# Patient Record
Sex: FEMALE | Race: WHITE
[De-identification: names, ages, dates, MRNs, and addresses within clinical notes are randomized per-mention and may not be internally consistent; named-entity substitution may affect disease eponyms.]

---

## 2017-01-09 ENCOUNTER — HOSPITAL ENCOUNTER (EMERGENCY)
Age: 82
Discharge: HOME | End: 2017-01-09
Payer: MEDICARE

## 2017-01-09 DIAGNOSIS — Z86.711: ICD-10-CM

## 2017-01-09 DIAGNOSIS — H53.8: Primary | ICD-10-CM

## 2017-01-09 DIAGNOSIS — Z79.82: ICD-10-CM

## 2017-01-09 DIAGNOSIS — R51: ICD-10-CM

## 2017-01-09 DIAGNOSIS — Z86.718: ICD-10-CM

## 2017-01-09 DIAGNOSIS — I10: ICD-10-CM

## 2017-01-09 DIAGNOSIS — Z95.0: ICD-10-CM

## 2017-01-09 DIAGNOSIS — N30.00: ICD-10-CM

## 2017-01-09 DIAGNOSIS — R42: ICD-10-CM

## 2017-01-17 ENCOUNTER — HOSPITAL ENCOUNTER (OUTPATIENT)
Age: 82
Discharge: HOME | End: 2017-01-17
Payer: MEDICARE

## 2017-01-17 DIAGNOSIS — I10: ICD-10-CM

## 2017-01-17 DIAGNOSIS — I49.5: ICD-10-CM

## 2017-01-17 DIAGNOSIS — I48.91: ICD-10-CM

## 2017-01-17 DIAGNOSIS — E87.1: Primary | ICD-10-CM

## 2017-08-08 ENCOUNTER — HOSPITAL ENCOUNTER (OUTPATIENT)
Dept: HOSPITAL 76 - LAB.WCP | Age: 82
Discharge: HOME | End: 2017-08-08
Attending: PHYSICIAN ASSISTANT
Payer: MEDICARE

## 2017-08-08 DIAGNOSIS — Z79.899: ICD-10-CM

## 2017-08-08 DIAGNOSIS — Z51.81: ICD-10-CM

## 2017-08-08 DIAGNOSIS — E87.1: Primary | ICD-10-CM

## 2017-08-08 LAB
ANION GAP SERPL CALCULATED.4IONS-SCNC: 8 MMOL/L (ref 6–13)
BUN SERPL-MCNC: 25 MG/DL (ref 6–20)
CALCIUM UR-MCNC: 9.6 MG/DL (ref 8.5–10.3)
CHLORIDE SERPL-SCNC: 100 MMOL/L (ref 101–111)
CO2 SERPL-SCNC: 27 MMOL/L (ref 21–32)
CREAT SERPLBLD-SCNC: 0.8 MG/DL (ref 0.4–1)
GFRSERPLBLD MDRD-ARVRAT: 68 ML/MIN/{1.73_M2} (ref 89–?)
GLUCOSE SERPL-MCNC: 94 MG/DL (ref 70–100)
POTASSIUM SERPL-SCNC: 4.5 MMOL/L (ref 3.5–5)
SODIUM SERPLBLD-SCNC: 135 MMOL/L (ref 135–145)

## 2017-08-08 PROCEDURE — 80048 BASIC METABOLIC PNL TOTAL CA: CPT

## 2017-08-08 PROCEDURE — 36415 COLL VENOUS BLD VENIPUNCTURE: CPT

## 2018-03-07 ENCOUNTER — HOSPITAL ENCOUNTER (OUTPATIENT)
Dept: HOSPITAL 76 - LAB.WCP | Age: 83
Discharge: HOME | End: 2018-03-07
Attending: FAMILY MEDICINE
Payer: MEDICARE

## 2018-03-07 DIAGNOSIS — R42: ICD-10-CM

## 2018-03-07 DIAGNOSIS — E87.1: ICD-10-CM

## 2018-03-07 DIAGNOSIS — I49.5: Primary | ICD-10-CM

## 2018-03-07 LAB
ALBUMIN DIAFP-MCNC: 4 G/DL (ref 3.2–5.5)
ALBUMIN/GLOB SERPL: 1.5 {RATIO} (ref 1–2.2)
ALP SERPL-CCNC: 54 IU/L (ref 42–121)
ALT SERPL W P-5'-P-CCNC: 18 IU/L (ref 10–60)
ANION GAP SERPL CALCULATED.4IONS-SCNC: 8 MMOL/L (ref 6–13)
AST SERPL W P-5'-P-CCNC: 27 IU/L (ref 10–42)
BASOPHILS NFR BLD AUTO: 0 10^3/UL (ref 0–0.1)
BASOPHILS NFR BLD AUTO: 0.7 %
BILIRUB BLD-MCNC: 0.3 MG/DL (ref 0.2–1)
BUN SERPL-MCNC: 32 MG/DL (ref 6–20)
CALCIUM UR-MCNC: 9.5 MG/DL (ref 8.5–10.3)
CHLORIDE SERPL-SCNC: 101 MMOL/L (ref 101–111)
CO2 SERPL-SCNC: 27 MMOL/L (ref 21–32)
CREAT SERPLBLD-SCNC: 0.9 MG/DL (ref 0.4–1)
EOSINOPHIL # BLD AUTO: 0.2 10^3/UL (ref 0–0.7)
EOSINOPHIL NFR BLD AUTO: 4.3 %
ERYTHROCYTE [DISTWIDTH] IN BLOOD BY AUTOMATED COUNT: 12.5 % (ref 12–15)
GFRSERPLBLD MDRD-ARVRAT: 60 ML/MIN/{1.73_M2} (ref 89–?)
GLOBULIN SER-MCNC: 2.6 G/DL (ref 2.1–4.2)
GLUCOSE SERPL-MCNC: 96 MG/DL (ref 70–100)
HGB UR QL STRIP: 11.9 G/DL (ref 12–16)
LYMPHOCYTES # SPEC AUTO: 1.6 10^3/UL (ref 1.5–3.5)
LYMPHOCYTES NFR BLD AUTO: 35.2 %
MCH RBC QN AUTO: 30.4 PG (ref 27–31)
MCHC RBC AUTO-ENTMCNC: 35.4 G/DL (ref 32–36)
MCV RBC AUTO: 85.7 FL (ref 81–99)
MONOCYTES # BLD AUTO: 0.5 10^3/UL (ref 0–1)
MONOCYTES NFR BLD AUTO: 10.2 %
NEUTROPHILS # BLD AUTO: 2.3 10^3/UL (ref 1.5–6.6)
NEUTROPHILS # SNV AUTO: 4.6 X10^3/UL (ref 4.8–10.8)
NEUTROPHILS NFR BLD AUTO: 49.6 %
PDW BLD AUTO: 8.7 FL (ref 7.9–10.8)
PLATELET # BLD: 209 10^3/UL (ref 130–450)
PROT SPEC-MCNC: 6.6 G/DL (ref 6.7–8.2)
RBC MAR: 3.93 10^6/UL (ref 4.2–5.4)
SODIUM SERPLBLD-SCNC: 136 MMOL/L (ref 135–145)

## 2018-03-07 PROCEDURE — 36415 COLL VENOUS BLD VENIPUNCTURE: CPT

## 2018-03-07 PROCEDURE — 84443 ASSAY THYROID STIM HORMONE: CPT

## 2018-03-07 PROCEDURE — 85025 COMPLETE CBC W/AUTO DIFF WBC: CPT

## 2018-03-07 PROCEDURE — 80053 COMPREHEN METABOLIC PANEL: CPT

## 2018-04-13 ENCOUNTER — HOSPITAL ENCOUNTER (OUTPATIENT)
Dept: HOSPITAL 76 - LAB.WCP | Age: 83
Discharge: HOME | End: 2018-04-13
Attending: PHYSICIAN ASSISTANT
Payer: MEDICARE

## 2018-04-13 DIAGNOSIS — D50.9: ICD-10-CM

## 2018-04-13 DIAGNOSIS — E87.1: Primary | ICD-10-CM

## 2018-04-13 LAB
ANION GAP SERPL CALCULATED.4IONS-SCNC: 6 MMOL/L (ref 6–13)
BASOPHILS NFR BLD AUTO: 0 10^3/UL (ref 0–0.1)
BASOPHILS NFR BLD AUTO: 0.6 %
BUN SERPL-MCNC: 30 MG/DL (ref 6–20)
CALCIUM UR-MCNC: 9.6 MG/DL (ref 8.5–10.3)
CHLORIDE SERPL-SCNC: 100 MMOL/L (ref 101–111)
CO2 SERPL-SCNC: 26 MMOL/L (ref 21–32)
CREAT SERPLBLD-SCNC: 0.8 MG/DL (ref 0.4–1)
EOSINOPHIL # BLD AUTO: 0.1 10^3/UL (ref 0–0.7)
EOSINOPHIL NFR BLD AUTO: 2 %
ERYTHROCYTE [DISTWIDTH] IN BLOOD BY AUTOMATED COUNT: 12.8 % (ref 12–15)
GFRSERPLBLD MDRD-ARVRAT: 68 ML/MIN/{1.73_M2} (ref 89–?)
GLUCOSE SERPL-MCNC: 107 MG/DL (ref 70–100)
HGB UR QL STRIP: 11.9 G/DL (ref 12–16)
LYMPHOCYTES # SPEC AUTO: 1.5 10^3/UL (ref 1.5–3.5)
LYMPHOCYTES NFR BLD AUTO: 27 %
MCH RBC QN AUTO: 29.8 PG (ref 27–31)
MCHC RBC AUTO-ENTMCNC: 34.5 G/DL (ref 32–36)
MCV RBC AUTO: 86.2 FL (ref 81–99)
MONOCYTES # BLD AUTO: 0.6 10^3/UL (ref 0–1)
MONOCYTES NFR BLD AUTO: 10.4 %
NEUTROPHILS # BLD AUTO: 3.3 10^3/UL (ref 1.5–6.6)
NEUTROPHILS # SNV AUTO: 5.5 X10^3/UL (ref 4.8–10.8)
NEUTROPHILS NFR BLD AUTO: 60 %
PDW BLD AUTO: 8.5 FL (ref 7.9–10.8)
PLATELET # BLD: 235 10^3/UL (ref 130–450)
RBC MAR: 4.01 10^6/UL (ref 4.2–5.4)
SODIUM SERPLBLD-SCNC: 132 MMOL/L (ref 135–145)

## 2018-04-13 PROCEDURE — 85025 COMPLETE CBC W/AUTO DIFF WBC: CPT

## 2018-04-13 PROCEDURE — 36415 COLL VENOUS BLD VENIPUNCTURE: CPT

## 2018-04-13 PROCEDURE — 80048 BASIC METABOLIC PNL TOTAL CA: CPT

## 2018-05-04 ENCOUNTER — HOSPITAL ENCOUNTER (EMERGENCY)
Dept: HOSPITAL 76 - ED | Age: 83
Discharge: HOME | End: 2018-05-04
Payer: MEDICARE

## 2018-05-04 VITALS — SYSTOLIC BLOOD PRESSURE: 144 MMHG | DIASTOLIC BLOOD PRESSURE: 58 MMHG

## 2018-05-04 DIAGNOSIS — I48.91: ICD-10-CM

## 2018-05-04 DIAGNOSIS — Z86.72: ICD-10-CM

## 2018-05-04 DIAGNOSIS — Z86.711: ICD-10-CM

## 2018-05-04 DIAGNOSIS — E87.1: Primary | ICD-10-CM

## 2018-05-04 DIAGNOSIS — I10: ICD-10-CM

## 2018-05-04 DIAGNOSIS — Z95.0: ICD-10-CM

## 2018-05-04 LAB
ANION GAP SERPL CALCULATED.4IONS-SCNC: 8 MMOL/L (ref 6–13)
BASOPHILS NFR BLD AUTO: 0 10^3/UL (ref 0–0.1)
BASOPHILS NFR BLD AUTO: 0.8 %
BUN SERPL-MCNC: 23 MG/DL (ref 6–20)
CALCIUM UR-MCNC: 9.4 MG/DL (ref 8.5–10.3)
CHLORIDE SERPL-SCNC: 97 MMOL/L (ref 101–111)
CO2 SERPL-SCNC: 25 MMOL/L (ref 21–32)
CREAT SERPLBLD-SCNC: 0.7 MG/DL (ref 0.4–1)
EOSINOPHIL # BLD AUTO: 0.1 10^3/UL (ref 0–0.7)
EOSINOPHIL NFR BLD AUTO: 2.3 %
ERYTHROCYTE [DISTWIDTH] IN BLOOD BY AUTOMATED COUNT: 13 % (ref 12–15)
GFRSERPLBLD MDRD-ARVRAT: 80 ML/MIN/{1.73_M2} (ref 89–?)
GLUCOSE SERPL-MCNC: 104 MG/DL (ref 70–100)
HGB UR QL STRIP: 12.5 G/DL (ref 12–16)
INR PPP: 1 (ref 0.8–1.2)
LYMPHOCYTES # SPEC AUTO: 1.4 10^3/UL (ref 1.5–3.5)
LYMPHOCYTES NFR BLD AUTO: 24.4 %
MCH RBC QN AUTO: 30.2 PG (ref 27–31)
MCHC RBC AUTO-ENTMCNC: 35.1 G/DL (ref 32–36)
MCV RBC AUTO: 86.2 FL (ref 81–99)
MONOCYTES # BLD AUTO: 0.6 10^3/UL (ref 0–1)
MONOCYTES NFR BLD AUTO: 10.6 %
NEUTROPHILS # BLD AUTO: 3.6 10^3/UL (ref 1.5–6.6)
NEUTROPHILS # SNV AUTO: 5.7 X10^3/UL (ref 4.8–10.8)
NEUTROPHILS NFR BLD AUTO: 61.9 %
PDW BLD AUTO: 8.1 FL (ref 7.9–10.8)
PLATELET # BLD: 197 10^3/UL (ref 130–450)
PROTHROM ACT/NOR PPP: 11 SECS (ref 9.9–12.6)
RBC MAR: 4.14 10^6/UL (ref 4.2–5.4)
SODIUM SERPLBLD-SCNC: 130 MMOL/L (ref 135–145)

## 2018-05-04 PROCEDURE — 99284 EMERGENCY DEPT VISIT MOD MDM: CPT

## 2018-05-04 PROCEDURE — 80048 BASIC METABOLIC PNL TOTAL CA: CPT

## 2018-05-04 PROCEDURE — 36415 COLL VENOUS BLD VENIPUNCTURE: CPT

## 2018-05-04 PROCEDURE — 70450 CT HEAD/BRAIN W/O DYE: CPT

## 2018-05-04 PROCEDURE — 85610 PROTHROMBIN TIME: CPT

## 2018-05-04 PROCEDURE — 93306 TTE W/DOPPLER COMPLETE: CPT

## 2018-05-04 PROCEDURE — 85025 COMPLETE CBC W/AUTO DIFF WBC: CPT

## 2018-05-04 PROCEDURE — 93880 EXTRACRANIAL BILAT STUDY: CPT

## 2018-05-04 PROCEDURE — 93005 ELECTROCARDIOGRAM TRACING: CPT

## 2018-05-04 NOTE — ED PHYSICIAN DOCUMENTATION
History of Present Illness





- Stated complaint


Stated Complaint: MIGRAINE/STROKE LIKE SX





- Chief complaint


Chief Complaint: Neuro





- Additonal information


Additional information: 





hx from pt


85 female


to ER concerned she is having TIAs


10 days of int sx of dizziness (by which she means off balance not like her 

prior BPV) nausea and sweats


no CP or palp


no loss of vision


no loss of hearing


no focal numbness


no focal weakness


no speech abn


no fall


numerous episodes over 10 days lasting approx 2 min each


no recent fever cough NVD


hx HTN and lipids, does not always remember to taker meds


no personal hx stroke but fhx


called PMD and sent to ED








Review of Systems


Constitutional: reports: Sweats.  denies: Fever, Chills


Eyes: denies: Decreased vision


Ears: denies: Loss of hearing


Cardiac: denies: Chest pain / pressure, Palpitations


Respiratory: denies: Dyspnea


GI: denies: Abdominal Pain, Nausea


Neurologic: denies: Focal weakness, Numbness, Difficulty speaking, Syncope, 

Headache, Head injury


Endocrine: denies: Easy bruising / bleeding


Immunocompromised: denies: Immunocompromised





PD PAST MEDICAL HISTORY





- Past Medical History


Cardiovascular: Hypertension, Deep vein thrombosis, Pulmonary embolism, Atrial 

fibrillation





- Past Surgical History


Past Surgical History: Yes


Ortho: Knee replacement


/GYN: Hysterectomy


Cardiovascular: Pacemaker





- Present Medications


Home Medications: 


 Ambulatory Orders











 Medication  Instructions  Recorded  Confirmed


 


Lisinopril 2.5 mg PO DAILY 10/12/14 10/07/15


 


Spironolactone 25 mg PO BID 10/12/14 10/07/15


 


traMADol [Ultram] 50 mg PO BID PRN 10/12/14 10/07/15














- Allergies


Allergies/Adverse Reactions: 


 Allergies











Allergy/AdvReac Type Severity Reaction Status Date / Time


 


shellfish derived Allergy  Unknown Verified 05/04/18 10:34














- Social History


Does the pt smoke?: No


Smoking Status: Never smoker


Does the pt drink ETOH?: No


Does the pt have substance abuse?: No





- Immunizations


Immunizations are current?: Yes





PD ED PE NORMAL





- Vitals


Vital signs reviewed: Yes





- General


General: Alert and oriented X 3, No acute distress





- HEENT


HEENT: PERRL, EOMI





- Neck


Neck: Supple, no meningeal sign





- Cardiac


Cardiac: RRR





- Respiratory


Respiratory: No respiratory distress





- Abdomen


Abdomen: Soft, Non tender





- Derm


Derm: Normal color





- Neuro


Neuro: Alert and oriented X 3, CNs 2-12 intact, No motor deficit, No sensory 

deficit, Normal speech


Eye Opening: Spontaneous


Motor: Obeys Commands


Verbal: Oriented


GCS Score: 15





Results





- Vitals


Vitals: 


 Vital Signs - 24 hr











  05/04/18 05/04/18 05/04/18





  10:32 11:36 15:34


 


Temperature 35.6 C L  


 


Heart Rate 66 62 74


 


Respiratory 18 14 16





Rate   


 


Blood Pressure 148/81 H 159/80 H 164/63 H


 


O2 Saturation 99 98 96








 Oxygen











O2 Source []                   Room air


 


O2 Source []                   Room air


 


O2 Source                      Room air

















- EKG (time done)


  ** 1054


Rate: Rate (enter#) (60)


Compare to prior EKG: Unchanged from prior EKG, Other (paced at 60, atrial paced

, inf ant ST elev c/w pacing and same as Jan 2017 EKG)





- Labs


Labs: 


 Laboratory Tests











  05/04/18 05/04/18 05/04/18





  11:00 11:00 11:00


 


WBC  5.7  


 


RBC  4.14 L  


 


Hgb  12.5  


 


Hct  35.6 L  


 


MCV  86.2  


 


MCH  30.2  


 


MCHC  35.1  


 


RDW  13.0  


 


Plt Count  197  


 


MPV  8.1  


 


Neut #  3.6  


 


Lymph #  1.4 L  


 


Mono #  0.6  


 


Eos #  0.1  


 


Baso #  0.0  


 


Absolute Nucleated RBC  0.00  


 


Nucleated RBC %  0.0  


 


PT   11.0 


 


INR   1.0 


 


Sodium    130 L


 


Potassium    4.4


 


Chloride    97 L


 


Carbon Dioxide    25


 


Anion Gap    8.0


 


BUN    23 H


 


Creatinine    0.7


 


Estimated GFR (MDRD)    80 L


 


Glucose    104 H


 


Calcium    9.4














- Rads (name of study)


  ** carotid doppler


Radiology: See rad report (no acute)





  ** CTH


Radiology: See rad report (no acute)





  ** echo


Radiology: See rad report (LVH, no thombus, EF 45%)





PD MEDICAL DECISION MAKING





- ED course


ED course: 





pt cannot have MRI 2/2 PPM and cannot have CTA 2/2 anaphylactic allergy


so had ti settle for CTH non con, carotid dopplers, and echo all of which were 

fine from point of view of TIA wup


she does report several meds being changed recently - that could have caused


long ED stay waiting for echo and no recurrent TIA epsiodes


will rec asa daily and dc to fup PMD





Departure





- Departure


Disposition: 01 Home, Self Care


Clinical Impression: 


 Hyponatremia





Condition: Good


Follow-Up: 


Jerry Garcia MD [Primary Care Provider] - 


Comments: 


All of your tests were reassuring today


Your pacemaker is working.


Your echocardiogram did not show any clots in the chambers of your heart to 

embolize and cause a stroke. Your ejection fraction is a bit low at 45% but I 

don't think that would cause the intermittent symptoms you have experienced (

please follow up with your heart doctor though)


The ultrasound of your neck arteries was fine.


And your blood work was fine except for low sodium which is not new for you..


You have not had any recurrent symptoms while in the ER.


So given the reassuring workup and resolved symptoms, i think it is safe for 

you to go home for the weekend


I would like yo to start taking a baby aspirin every day for stroke prevention


Please follow up with your PMD for a recheck next week.


Return to the ER if worse in any way





NIHSS





- Time


Time: 10:50





- Level of Consciousness


Level of consciousness: (0) Alert, Keenly responsive


LOC Questions: (0) Answers both Q's correct


LOC Commands: (0) Performs both correctly





- Gaze


Best Gaze: (0) Normal





- Visual


Visual: (0) No loss





- Facial Palsy


Facial Palsy: (0) Normal, symmetrical movement





- Motor Arms (both separate)


Motor Arm (right): (0) No drift


Motor Arm (left): (0) No drift





- Motor Legs (both separate)


Motor Leg (right): (0) No drift


Motor Leg (left): (0) No drift





- Limb Ataxia


Limb Ataxia: (0) Absent





- Sensory


Sensory: (0) Normal





- Best Language


Best Language: (0) No aphasia





- Dysarthria


Dysarthria: (0) Normal





- Extinction and Inattention (formally neg


Extinction and inattention: (0) No abnormality





- Total Score/Results


Total Score/Result: 0

## 2018-05-04 NOTE — CT REPORT
EXAM:

CT HEAD

 

EXAM DATE: 5/4/2018 11:38 AM.

 

CLINICAL HISTORY: Stroke sx. dizziness. MRI contraindicated secondary to pacemaker.

 

COMPARISON: 01/09/2017.

 

TECHNIQUE: Multiaxial CT images were obtained from the foramen magnum to the vertex. Reformats: Coron
al. IV contrast: None. 

 

In accordance with CT protocol optimization, one or more of the following dose reduction techniques w
ere utilized for this exam: automated exposure control, adjustment of mA and/or KV based on patient s
ize, or use of iterative reconstructive technique.

 

FINDINGS:

Parenchyma: No intraparenchymal hemorrhage. No evidence of mass, midline shift, or CT findings of acu
te infarction. Gray-white differentiation is distinct. Diffuse chronic microangiopathic white matter 
changes are evident.

 

Extraaxial Spaces: Normal for age. No subdural or epidural collections identified.

 

Ventricles: The ventricles and cortical sulci are enlarged, consistent with age-related tissue loss.

 

Sinuses and orbits: Imaged paranasal sinuses, orbits, and mastoids show no significant abnormality.

 

Bones: No evidence of fracture or calvarial defect.

 

Other: None.

 

IMPRESSION: Generalized age-related cortical atrophic changes without evidence of acute intracranial 
abnormality. No significant change from prior.

 

[facility]

Referring Provider Line: [phone]

 

SITE ID: [id number]

## 2018-05-05 NOTE — ULTRASOUND REPORT
CAROTID DUPLEX:  05/04/2018

 

CLINICAL INDICATION:  Stroke symptoms.

 

COMPARISON:  08/18/2014

 

TECHNIQUE:  Real-time sonographic vascular imaging was performed by the 
sonographer through the carotid arteries 

utilizing both color-flow and Doppler spectral analysis.  Multiple 
representative static images were saved for review.

 

RIGHT





Vessel PSV cm/sec EDV cm/sec ICA/CCA RSV Ratio Degree of Stenosis Plaque 
Estimate %

 

RCCA Prox 40 -- --  

 

RCCA Dist 47 10 --  

 

RECA 45 -- --  

 

RT BULB 19 3 0.40  

 

ALEX Prox 57 18 1.21  

 

ALEX Mid 83 15 1.76  

 

ALEX Dist 74 15 1.57  

 

RVA 30 -- --  





RVA flow direction: Antegrade

 



LEFT 





Vessel PSV cm/sec EDV cm/sec ICA/CCA RSV Ratio Degree of Stenosis Plaque 
Estimate %

 

LCCA Prox 51 -- --  

 

LCCA Dist 36 8 --  

 

LECA 44 -- --  

 

LFT BULB 29 9 0.80  

 

LICA Prox 47 13 1.30  

 

LICA Mid 28 8 0.77  

 

LICA Dist 102 24 2.83  

 

LVA 50 -- --  





LVA flow direction: Antegrade

 

 

Velocity criteria are extrapolated from diameter data as defined by the Society 
of Radiologists in Ultrasound Consensus Conference Radiology 2003; 229; 340-346.





Degree of Stenosis % ICA PSV cm/sec ICA EDV cm/sec ICA/CCA PSV Ratio Plaque 
Estimate %

 

Normal < 125 < 40 < 2.0 None

 

<50 < 125 < 40 < 2.0 < 50

 

50-69 125-130  2.0-4.0 >/=50

 

>/=70 but less than near occlusion > 230 > 100 > 4.0 >/=50

 

Near occlusion High, low or undetectable Variable Variable Visible

 

Total occlusion Undetectable Not applicable Not applicable No detectable lumen







FINDINGS

RIGHT:  There is minimal plaquing in the right carotid bifurcation, without 
evidence of a focal hemodynamically significant stenosis.

 

LEFT:  There is minimal plaquing in the left carotid bifurcation, without 
evidence of a focal hemodynamically significant stenosis.  

Distal tortuosity contributes to the distal velocity elevations seen.

 

The vertebral arteries demonstrate antegrade flow bilaterally.

 

IMPRESSION:  NO EVIDENCE OF A HEMODYNAMICALLY SIGNIFICANT CAROTID STENOSIS.

 

 

DD: 05/04/2018 13:24

TD: 05/04/2018 17:35

Job #: [id number]

[facility]

## 2018-05-15 ENCOUNTER — HOSPITAL ENCOUNTER (OUTPATIENT)
Dept: HOSPITAL 76 - LAB.WCP | Age: 83
Discharge: HOME | End: 2018-05-15
Attending: FAMILY MEDICINE
Payer: MEDICARE

## 2018-05-15 DIAGNOSIS — I10: ICD-10-CM

## 2018-05-15 DIAGNOSIS — I48.91: ICD-10-CM

## 2018-05-15 DIAGNOSIS — E87.1: Primary | ICD-10-CM

## 2018-05-15 LAB
ANION GAP SERPL CALCULATED.4IONS-SCNC: 10 MMOL/L (ref 6–13)
BUN SERPL-MCNC: 23 MG/DL (ref 6–20)
CALCIUM UR-MCNC: 10 MG/DL (ref 8.5–10.3)
CHLORIDE SERPL-SCNC: 96 MMOL/L (ref 101–111)
CO2 SERPL-SCNC: 25 MMOL/L (ref 21–32)
CREAT SERPLBLD-SCNC: 0.8 MG/DL (ref 0.4–1)
GFRSERPLBLD MDRD-ARVRAT: 68 ML/MIN/{1.73_M2} (ref 89–?)
GLUCOSE SERPL-MCNC: 119 MG/DL (ref 70–100)
SODIUM SERPLBLD-SCNC: 131 MMOL/L (ref 135–145)

## 2018-05-15 PROCEDURE — 80048 BASIC METABOLIC PNL TOTAL CA: CPT

## 2018-05-15 PROCEDURE — 36415 COLL VENOUS BLD VENIPUNCTURE: CPT

## 2018-06-05 ENCOUNTER — HOSPITAL ENCOUNTER (OUTPATIENT)
Dept: HOSPITAL 76 - LAB.WCP | Age: 83
Discharge: HOME | End: 2018-06-05
Attending: FAMILY MEDICINE
Payer: MEDICARE

## 2018-06-05 DIAGNOSIS — E87.1: ICD-10-CM

## 2018-06-05 DIAGNOSIS — R42: Primary | ICD-10-CM

## 2018-06-05 DIAGNOSIS — I10: ICD-10-CM

## 2018-06-05 DIAGNOSIS — D50.9: ICD-10-CM

## 2018-06-05 LAB
ALBUMIN DIAFP-MCNC: 4 G/DL (ref 3.2–5.5)
ALBUMIN/GLOB SERPL: 1.4 {RATIO} (ref 1–2.2)
ALP SERPL-CCNC: 52 IU/L (ref 42–121)
ALT SERPL W P-5'-P-CCNC: 22 IU/L (ref 10–60)
ANION GAP SERPL CALCULATED.4IONS-SCNC: 8 MMOL/L (ref 6–13)
AST SERPL W P-5'-P-CCNC: 30 IU/L (ref 10–42)
BASOPHILS NFR BLD AUTO: 0.1 10^3/UL (ref 0–0.1)
BASOPHILS NFR BLD AUTO: 0.9 %
BILIRUB BLD-MCNC: 0.6 MG/DL (ref 0.2–1)
BUN SERPL-MCNC: 26 MG/DL (ref 6–20)
CALCIUM UR-MCNC: 9.6 MG/DL (ref 8.5–10.3)
CHLORIDE SERPL-SCNC: 99 MMOL/L (ref 101–111)
CO2 SERPL-SCNC: 24 MMOL/L (ref 21–32)
CREAT SERPLBLD-SCNC: 0.8 MG/DL (ref 0.4–1)
EOSINOPHIL # BLD AUTO: 0.1 10^3/UL (ref 0–0.7)
EOSINOPHIL NFR BLD AUTO: 2 %
ERYTHROCYTE [DISTWIDTH] IN BLOOD BY AUTOMATED COUNT: 14.2 % (ref 12–15)
GFRSERPLBLD MDRD-ARVRAT: 68 ML/MIN/{1.73_M2} (ref 89–?)
GLOBULIN SER-MCNC: 2.8 G/DL (ref 2.1–4.2)
GLUCOSE SERPL-MCNC: 94 MG/DL (ref 70–100)
HGB UR QL STRIP: 12.1 G/DL (ref 12–16)
LYMPHOCYTES # SPEC AUTO: 1.3 10^3/UL (ref 1.5–3.5)
LYMPHOCYTES NFR BLD AUTO: 24 %
MCH RBC QN AUTO: 29.5 PG (ref 27–31)
MCHC RBC AUTO-ENTMCNC: 33.1 G/DL (ref 32–36)
MCV RBC AUTO: 89.3 FL (ref 81–99)
MONOCYTES # BLD AUTO: 0.6 10^3/UL (ref 0–1)
MONOCYTES NFR BLD AUTO: 10.2 %
NEUTROPHILS # BLD AUTO: 3.5 10^3/UL (ref 1.5–6.6)
NEUTROPHILS # SNV AUTO: 5.6 X10^3/UL (ref 4.8–10.8)
NEUTROPHILS NFR BLD AUTO: 62.9 %
PDW BLD AUTO: 8.3 FL (ref 7.9–10.8)
PLATELET # BLD: 239 10^3/UL (ref 130–450)
PROT SPEC-MCNC: 6.8 G/DL (ref 6.7–8.2)
RBC MAR: 4.09 10^6/UL (ref 4.2–5.4)
SODIUM SERPLBLD-SCNC: 131 MMOL/L (ref 135–145)

## 2018-06-05 PROCEDURE — 80053 COMPREHEN METABOLIC PANEL: CPT

## 2018-06-05 PROCEDURE — 36415 COLL VENOUS BLD VENIPUNCTURE: CPT

## 2018-06-05 PROCEDURE — 85025 COMPLETE CBC W/AUTO DIFF WBC: CPT

## 2019-01-31 ENCOUNTER — HOSPITAL ENCOUNTER (OUTPATIENT)
Dept: HOSPITAL 76 - DI | Age: 84
Discharge: HOME | End: 2019-01-31
Attending: FAMILY MEDICINE
Payer: MEDICARE

## 2019-01-31 DIAGNOSIS — Z12.31: Primary | ICD-10-CM

## 2019-01-31 PROCEDURE — 77063 BREAST TOMOSYNTHESIS BI: CPT

## 2019-01-31 PROCEDURE — 77067 SCR MAMMO BI INCL CAD: CPT

## 2019-02-01 NOTE — MAMMOGRAPHY REPORT
Reason:  SCREENING MAMMO

Procedure Date:  01/31/2019   

Accession Number:  [id number] / [id number]                    

Procedure:  LISSA - Screening Mammo w/Chvio CPT Code:  

 

FULL RESULT:

 

 

EXAM: Screening Mammo w/Chivo

 

DATE: 1/31/2019 3:31 PM

 

CLINICAL HISTORY: Screening encounter. No reported risk factors.

 

TECHNIQUE: Bilateral CC and MLO views were obtained. A right medially 

exaggerated CC view was obtained.

 

COMPARISON: 10/20/2016 through 10/27/2009.

 

FINDINGS:

The breasts demonstrate scattered fibroglandular densities bilaterally. A 

pacemaker is again noted in the left chest wall. No suspicious masses, 

clustered microcalcifications, or regions of architectural distortion are 

identified.

 

IMPRESSION: Benign findings

 

RECOMMENDATION: Routine annual screening unless otherwise clinically 

indicated.

 

BIRADS CATEGORY 2: Benign findings

 

STANDARD QUALIFYING STATEMENTS:

1.  This examination was not reviewed with the aid of Computer-Aided 

Detection (CAD).

2.  A negative or benign  imaging report should not preclude biopsy if 

clinically suspicious findings are present.

3.  Dense breasts may obscure an underlying neoplasm.

4. This examination was reviewed with the aid of 3D breast imaging 

(tomosynthesis).

## 2019-03-20 ENCOUNTER — HOSPITAL ENCOUNTER (OUTPATIENT)
Dept: HOSPITAL 76 - LAB.WCP | Age: 84
Discharge: HOME | End: 2019-03-20
Attending: INTERNAL MEDICINE
Payer: MEDICARE

## 2019-03-20 ENCOUNTER — HOSPITAL ENCOUNTER (OUTPATIENT)
Dept: HOSPITAL 76 - DI.WCP | Age: 84
Discharge: HOME | End: 2019-03-20
Attending: INTERNAL MEDICINE
Payer: MEDICARE

## 2019-03-20 DIAGNOSIS — M19.071: ICD-10-CM

## 2019-03-20 DIAGNOSIS — M19.072: Primary | ICD-10-CM

## 2019-03-20 DIAGNOSIS — R76.8: ICD-10-CM

## 2019-03-20 DIAGNOSIS — M25.50: ICD-10-CM

## 2019-03-20 DIAGNOSIS — M25.50: Primary | ICD-10-CM

## 2019-03-20 DIAGNOSIS — M21.611: ICD-10-CM

## 2019-03-20 LAB
ALBUMIN DIAFP-MCNC: 4.1 G/DL (ref 3.2–5.5)
ALBUMIN/GLOB SERPL: 1.5 {RATIO} (ref 1–2.2)
ALP SERPL-CCNC: 54 IU/L (ref 42–121)
ALT SERPL W P-5'-P-CCNC: 22 IU/L (ref 10–60)
ANION GAP SERPL CALCULATED.4IONS-SCNC: 10 MMOL/L (ref 6–13)
AST SERPL W P-5'-P-CCNC: 30 IU/L (ref 10–42)
BILIRUB BLD-MCNC: 0.6 MG/DL (ref 0.2–1)
BUN SERPL-MCNC: 24 MG/DL (ref 6–20)
CALCIUM UR-MCNC: 9.8 MG/DL (ref 8.5–10.3)
CHLORIDE SERPL-SCNC: 101 MMOL/L (ref 101–111)
CO2 SERPL-SCNC: 23 MMOL/L (ref 21–32)
CREAT SERPLBLD-SCNC: 0.8 MG/DL (ref 0.4–1)
CRP SERPL-MCNC: 1 MG/DL (ref 0–1)
GFRSERPLBLD MDRD-ARVRAT: 68 ML/MIN/{1.73_M2} (ref 89–?)
GLOBULIN SER-MCNC: 2.8 G/DL (ref 2.1–4.2)
GLUCOSE SERPL-MCNC: 109 MG/DL (ref 70–100)
PROT SPEC-MCNC: 6.9 G/DL (ref 6.7–8.2)
SODIUM SERPLBLD-SCNC: 134 MMOL/L (ref 135–145)

## 2019-03-20 PROCEDURE — 86200 CCP ANTIBODY: CPT

## 2019-03-20 PROCEDURE — 80053 COMPREHEN METABOLIC PANEL: CPT

## 2019-03-20 PROCEDURE — 36415 COLL VENOUS BLD VENIPUNCTURE: CPT

## 2019-03-20 PROCEDURE — 85651 RBC SED RATE NONAUTOMATED: CPT

## 2019-03-20 PROCEDURE — 87340 HEPATITIS B SURFACE AG IA: CPT

## 2019-03-20 PROCEDURE — 86140 C-REACTIVE PROTEIN: CPT

## 2019-03-20 PROCEDURE — 86803 HEPATITIS C AB TEST: CPT

## 2019-03-21 LAB
HEPATITIS B SURFACE ANTIGEN: (no result)
HEPATITIS C ANTIBODY: (no result)
SIGNAL TO CUT-OFF: 0 (ref ?–1)

## 2019-03-21 NOTE — XRAY REPORT
Reason:  BILATERAL FEET PAIN

Procedure Date:  03/20/2019   

Accession Number:  [id number] / [id number]                    

Procedure:  WCP - Foot 2 View BILAT CPT Code:  

 

FULL RESULT:

 

 

EXAMS:

1. RIGHT FOOT RADIOGRAPHY

2. LEFT FOOT RADIOGRAPHY

 

EXAM DATE: 3/20/2019 03:24 PM.

 

CLINICAL HISTORY: Bilateral feet pain.

 

COMPARISON: FOOT 2 VIEW BILAT 11/21/2013 11:38 AM.

 

TECHNIQUE: 2 views each foot.

 

FINDINGS:

Right:

Bones: No fractures or bone lesions. Small calcaneal spur at the 

attachment of the plantar aponeurosis.

 

Joints: No dislocation or subluxation. There is mild bunion deformity of 

the great toe. Minor degenerative changes of the IP joints of the digits 

commensurate with age.

 

Soft Tissues: Normal. No soft tissue swelling.

 

Left:

Bones: No fractures or bone lesions. Small calcaneal spur at the 

attachment of the plantar aponeurosis.

 

Joints: Minor degenerative changes of the IP joints of the digits. No 

subluxations.

 

Soft Tissues: Normal. No soft tissue swelling.

IMPRESSION:

1. Minor degenerative changes similar to the prior exam.

2. No fractures.

3. Mild bunion deformity on the right.

 

[facility]

## 2019-07-17 ENCOUNTER — HOSPITAL ENCOUNTER (OUTPATIENT)
Dept: HOSPITAL 76 - LAB | Age: 84
Discharge: HOME | End: 2019-07-17
Attending: FAMILY MEDICINE
Payer: MEDICARE

## 2019-07-17 ENCOUNTER — HOSPITAL ENCOUNTER (EMERGENCY)
Dept: HOSPITAL 76 - ED | Age: 84
Discharge: LEFT BEFORE DISCHARGE | End: 2019-07-17
Payer: MEDICARE

## 2019-07-17 VITALS — DIASTOLIC BLOOD PRESSURE: 57 MMHG | SYSTOLIC BLOOD PRESSURE: 187 MMHG

## 2019-07-17 DIAGNOSIS — Z53.21: ICD-10-CM

## 2019-07-17 DIAGNOSIS — R58: Primary | ICD-10-CM

## 2019-07-17 LAB
ALBUMIN DIAFP-MCNC: 4.1 G/DL (ref 3.2–5.5)
ALBUMIN/GLOB SERPL: 1.5 {RATIO} (ref 1–2.2)
ALP SERPL-CCNC: 58 IU/L (ref 42–121)
ALT SERPL W P-5'-P-CCNC: 23 IU/L (ref 10–60)
ANION GAP SERPL CALCULATED.4IONS-SCNC: 12 MMOL/L (ref 6–13)
APTT PPP: 33.4 SECS (ref 24.9–33.3)
AST SERPL W P-5'-P-CCNC: 28 IU/L (ref 10–42)
BASOPHILS NFR BLD AUTO: 0 10^3/UL (ref 0–0.1)
BASOPHILS NFR BLD AUTO: 0.7 %
BILIRUB BLD-MCNC: 0.5 MG/DL (ref 0.2–1)
BUN SERPL-MCNC: 25 MG/DL (ref 6–20)
CALCIUM UR-MCNC: 9.7 MG/DL (ref 8.5–10.3)
CHLORIDE SERPL-SCNC: 100 MMOL/L (ref 101–111)
CO2 SERPL-SCNC: 24 MMOL/L (ref 21–32)
CREAT SERPLBLD-SCNC: 0.9 MG/DL (ref 0.4–1)
EOSINOPHIL # BLD AUTO: 0.2 10^3/UL (ref 0–0.7)
EOSINOPHIL NFR BLD AUTO: 2.8 %
ERYTHROCYTE [DISTWIDTH] IN BLOOD BY AUTOMATED COUNT: 12.2 % (ref 12–15)
GFRSERPLBLD MDRD-ARVRAT: 59 ML/MIN/{1.73_M2} (ref 89–?)
GLOBULIN SER-MCNC: 2.8 G/DL (ref 2.1–4.2)
GLUCOSE SERPL-MCNC: 108 MG/DL (ref 70–100)
HGB UR QL STRIP: 11.6 G/DL (ref 12–16)
INR PPP: 1 (ref 0.8–1.2)
LYMPHOCYTES # SPEC AUTO: 1.5 10^3/UL (ref 1.5–3.5)
LYMPHOCYTES NFR BLD AUTO: 26 %
MCH RBC QN AUTO: 30.8 PG (ref 27–31)
MCHC RBC AUTO-ENTMCNC: 35 G/DL (ref 32–36)
MCV RBC AUTO: 87.8 FL (ref 81–99)
MONOCYTES # BLD AUTO: 0.6 10^3/UL (ref 0–1)
MONOCYTES NFR BLD AUTO: 10 %
NEUTROPHILS # BLD AUTO: 3.4 10^3/UL (ref 1.5–6.6)
NEUTROPHILS # SNV AUTO: 5.7 X10^3/UL (ref 4.8–10.8)
NEUTROPHILS NFR BLD AUTO: 60.3 %
PDW BLD AUTO: 9.5 FL (ref 7.9–10.8)
PLATELET # BLD: 217 10^3/UL (ref 130–450)
PROT SPEC-MCNC: 6.9 G/DL (ref 6.7–8.2)
PROTHROM ACT/NOR PPP: 11.2 SECS (ref 9.9–12.6)
RBC MAR: 3.77 10^6/UL (ref 4.2–5.4)
SODIUM SERPLBLD-SCNC: 136 MMOL/L (ref 135–145)

## 2019-07-17 PROCEDURE — 80053 COMPREHEN METABOLIC PANEL: CPT

## 2019-07-17 PROCEDURE — 85730 THROMBOPLASTIN TIME PARTIAL: CPT

## 2019-07-17 PROCEDURE — 83690 ASSAY OF LIPASE: CPT

## 2019-07-17 PROCEDURE — 85025 COMPLETE CBC W/AUTO DIFF WBC: CPT

## 2019-07-17 PROCEDURE — 85610 PROTHROMBIN TIME: CPT

## 2019-07-17 PROCEDURE — 36415 COLL VENOUS BLD VENIPUNCTURE: CPT

## 2020-07-22 ENCOUNTER — HOSPITAL ENCOUNTER (OUTPATIENT)
Dept: HOSPITAL 76 - DI.WCP | Age: 85
Discharge: HOME | End: 2020-07-22
Attending: NURSE PRACTITIONER
Payer: MEDICARE

## 2020-07-22 DIAGNOSIS — M43.12: Primary | ICD-10-CM

## 2020-07-22 DIAGNOSIS — M50.31: ICD-10-CM

## 2020-07-22 DIAGNOSIS — M47.812: ICD-10-CM

## 2020-07-22 PROCEDURE — 72040 X-RAY EXAM NECK SPINE 2-3 VW: CPT

## 2020-07-22 NOTE — XRAY REPORT
Reason:  NECK PAIN

Procedure Date:  07/22/2020   

Accession Number:  [id number] / [id number]                    

Procedure:  WCP - Cervical Spine 2 View CPT Code:  

 

***Final Report***

 

 

FULL RESULT:

 

 

PROCEDURE:  Cervical Spine 2 View

 

INDICATIONS:  NECK PAIN

 

TECHNIQUE:  2 view(s) of the cervical spine were acquired.

 

COMPARISON:  None.

 

FINDINGS:

 

Bones:  No fractures or dislocations to the T1 level. There appears to be 

complete atlantodental interval joint space loss with posterior 

subluxation of C1. There is trace anterolisthesis of C4 on 5. Alignment 

is otherwise maintained. Moderate disc height loss and endplate spur 

formation at the C6-7 level. There is facet hypertrophy bilaterally 

throughout the cervical spine. No suspicious bony lesions.

 

Soft tissues:  No prevertebral soft tissue swelling.  Dorsal soft tissue 

dystrophic calcifications in the C3-C6 level.

 

IMPRESSION:

1. Predominant facet arthropathy throughout the cervical spine probably 

allows for trace anterolisthesis C4 on 5.

2. Degenerative disc and endplate changes at the C6-7 level.

3. Severe degeneration at the C1-2 level. There is probable posterior 

C1-2 subluxation. This could be further evaluated with CT imaging.

 

Reviewed by: Madison Avila MD on 7/22/2020 2:25 PM PDT

Approved by: Madison Avila MD on 7/22/2020 2:25 PM PDT

 

 

Station ID:  [id number]

## 2020-11-27 ENCOUNTER — HOSPITAL ENCOUNTER (OUTPATIENT)
Dept: HOSPITAL 76 - DI | Age: 85
Discharge: HOME | End: 2020-11-27
Attending: NURSE PRACTITIONER
Payer: MEDICARE

## 2020-11-27 DIAGNOSIS — M48.02: ICD-10-CM

## 2020-11-27 DIAGNOSIS — E01.0: ICD-10-CM

## 2020-11-27 DIAGNOSIS — M50.321: Primary | ICD-10-CM

## 2020-11-27 PROCEDURE — 72125 CT NECK SPINE W/O DYE: CPT

## 2020-11-27 NOTE — CT REPORT
PROCEDURE:  CERVICAL SPINE WO

 

INDICATIONS:  DJD, CERVICAL SPINE

 

TECHNIQUE:  

Noncontrast 3 mm thick sections acquired from the skull base to the T4 level.  Sagittal and coronal r
eformats were then constructed.  For radiation dose reduction, the following was used:  automated exp
osure control, adjustment of mA and/or kV according to patient size.

 

COMPARISON:  Cervical spine radiograph dated 7/22/2020.

 

FINDINGS:  

Image quality:  Excellent.  

 

Bones:  No fractures or dislocations.  Visualized superior ribs are intact. Degenerative endplate anastasiya
nges are noted throughout cervical spine. There is mild degenerative disc bulge and bilateral facet h
ypertrophic changes seen at C4-5, C5-6 and C6-7 levels with mild central canal stenosis. No significa
nt neural foraminal narrowing is seen.

 

Soft tissues:  Prevertebral soft tissues are normal in thickness.  No paravertebral hematomas.  No ap
ical pneumothoraces.  Left thyroid lobe is asymmetrically enlarged with suggestion of mid to lower po
le thyroid nodule.

 

IMPRESSION:  

 

1. Degenerative disc disease throughout cervical spine with mild central canal stenosis at C4-5 throu
gh C6-7 levels. No acute compression fracture or spondylolisthesis.

2. Left thyroid lobe is enlarged with suggestion of hypodense nodule in mid to lower pole. Ultrasound
 of thyroid gland can be done for further evaluation of this region if indicated.

 

Reviewed by: Cody Hickman MD on 11/27/2020 1:58 PM PST

Approved by: Cody Hickman MD on 11/27/2020 1:58 PM PST

 

 

Station ID:  [id number]

## 2020-12-03 ENCOUNTER — HOSPITAL ENCOUNTER (OUTPATIENT)
Dept: HOSPITAL 76 - LAB.WCP | Age: 85
Discharge: HOME | End: 2020-12-03
Attending: NURSE PRACTITIONER
Payer: MEDICARE

## 2020-12-03 DIAGNOSIS — E04.1: Primary | ICD-10-CM

## 2020-12-03 DIAGNOSIS — Z79.899: ICD-10-CM

## 2020-12-03 DIAGNOSIS — E87.1: ICD-10-CM

## 2020-12-03 LAB
ALBUMIN DIAFP-MCNC: 4.3 G/DL (ref 3.2–5.5)
ALBUMIN/GLOB SERPL: 1.5 {RATIO} (ref 1–2.2)
ALP SERPL-CCNC: 63 IU/L (ref 42–121)
ALT SERPL W P-5'-P-CCNC: 18 IU/L (ref 10–60)
ANION GAP SERPL CALCULATED.4IONS-SCNC: 10 MMOL/L (ref 6–13)
AST SERPL W P-5'-P-CCNC: 27 IU/L (ref 10–42)
BASOPHILS NFR BLD AUTO: 0 10^3/UL (ref 0–0.1)
BASOPHILS NFR BLD AUTO: 0.5 %
BILIRUB BLD-MCNC: 0.7 MG/DL (ref 0.2–1)
BUN SERPL-MCNC: 30 MG/DL (ref 6–20)
CALCIUM UR-MCNC: 10.2 MG/DL (ref 8.5–10.3)
CHLORIDE SERPL-SCNC: 97 MMOL/L (ref 101–111)
CO2 SERPL-SCNC: 25 MMOL/L (ref 21–32)
CREAT SERPLBLD-SCNC: 1.3 MG/DL (ref 0.4–1)
EOSINOPHIL # BLD AUTO: 0.1 10^3/UL (ref 0–0.7)
EOSINOPHIL NFR BLD AUTO: 1.7 %
ERYTHROCYTE [DISTWIDTH] IN BLOOD BY AUTOMATED COUNT: 13.9 % (ref 12–15)
GLOBULIN SER-MCNC: 2.8 G/DL (ref 2.1–4.2)
GLUCOSE SERPL-MCNC: 115 MG/DL (ref 70–100)
HGB UR QL STRIP: 12 G/DL (ref 12–16)
LYMPHOCYTES # SPEC AUTO: 1.4 10^3/UL (ref 1.5–3.5)
LYMPHOCYTES NFR BLD AUTO: 24.7 %
MCH RBC QN AUTO: 28.6 PG (ref 27–31)
MCHC RBC AUTO-ENTMCNC: 33.5 G/DL (ref 32–36)
MCV RBC AUTO: 85.4 FL (ref 81–99)
MONOCYTES # BLD AUTO: 0.8 10^3/UL (ref 0–1)
MONOCYTES NFR BLD AUTO: 13.7 %
NEUTROPHILS # BLD AUTO: 3.4 10^3/UL (ref 1.5–6.6)
NEUTROPHILS # SNV AUTO: 5.8 X10^3/UL (ref 4.8–10.8)
NEUTROPHILS NFR BLD AUTO: 59.1 %
PDW BLD AUTO: 9.9 FL (ref 7.9–10.8)
PLATELET # BLD: 288 10^3/UL (ref 130–450)
PROT SPEC-MCNC: 7.1 G/DL (ref 6.7–8.2)
RBC MAR: 4.19 10^6/UL (ref 4.2–5.4)
SODIUM SERPLBLD-SCNC: 132 MMOL/L (ref 135–145)

## 2020-12-03 PROCEDURE — 81001 URINALYSIS AUTO W/SCOPE: CPT

## 2020-12-03 PROCEDURE — 36415 COLL VENOUS BLD VENIPUNCTURE: CPT

## 2020-12-03 PROCEDURE — 85025 COMPLETE CBC W/AUTO DIFF WBC: CPT

## 2020-12-03 PROCEDURE — 84443 ASSAY THYROID STIM HORMONE: CPT

## 2020-12-03 PROCEDURE — 80053 COMPREHEN METABOLIC PANEL: CPT

## 2020-12-03 PROCEDURE — 87086 URINE CULTURE/COLONY COUNT: CPT

## 2020-12-06 ENCOUNTER — HOSPITAL ENCOUNTER (OUTPATIENT)
Dept: HOSPITAL 76 - LAB.R | Age: 85
Discharge: HOME | End: 2020-12-06
Attending: NURSE PRACTITIONER
Payer: MEDICARE

## 2020-12-06 DIAGNOSIS — E87.1: ICD-10-CM

## 2020-12-06 DIAGNOSIS — E04.1: Primary | ICD-10-CM

## 2020-12-06 DIAGNOSIS — Z79.899: ICD-10-CM

## 2020-12-06 PROCEDURE — 87086 URINE CULTURE/COLONY COUNT: CPT

## 2020-12-06 PROCEDURE — 81001 URINALYSIS AUTO W/SCOPE: CPT

## 2020-12-06 PROCEDURE — 87181 SC STD AGAR DILUTION PER AGT: CPT

## 2020-12-07 LAB
CLARITY UR REFRACT.AUTO: (no result)
GLUCOSE UR QL STRIP.AUTO: NEGATIVE MG/DL
KETONES UR QL STRIP.AUTO: NEGATIVE MG/DL
NITRITE UR QL STRIP.AUTO: NEGATIVE
PH UR STRIP.AUTO: 7.5 PH (ref 5–7.5)
PROT UR STRIP.AUTO-MCNC: NEGATIVE MG/DL
RBC # UR STRIP.AUTO: NEGATIVE /UL
RBC # URNS HPF: (no result) /HPF (ref 0–5)
SP GR UR STRIP.AUTO: 1.01 (ref 1–1.03)
SQUAMOUS #/AREA URNS HPF: (no result) /HPF
SQUAMOUS URNS QL MICRO: (no result)
UROBILINOGEN UR QL STRIP.AUTO: (no result) E.U./DL
UROBILINOGEN UR STRIP.AUTO-MCNC: NEGATIVE MG/DL

## 2020-12-11 ENCOUNTER — HOSPITAL ENCOUNTER (OUTPATIENT)
Dept: HOSPITAL 76 - DI | Age: 85
Discharge: HOME | End: 2020-12-11
Attending: NURSE PRACTITIONER
Payer: MEDICARE

## 2020-12-11 DIAGNOSIS — E04.2: Primary | ICD-10-CM

## 2020-12-11 NOTE — ULTRASOUND REPORT
PROCEDURE:  Head or Neck Soft Tissue

 

INDICATIONS:  THYROID NODULE

 

TECHNIQUE:  

Real-time scanning was performed of the thyroid gland, with image documentation.  

 

COMPARISON:  None

 

FINDINGS:  

Right:  Thyroid lobe measures 4.5 x 1.7 x 1.7 cm, and is homogeneous in echotexture.  

Left:  Thyroid lobe measures 5.1 x 1.9 x 2.2 cm, and is homogenous in echotexture.  

Isthmus:  1  mm thick.  

 

Nodule number:  One

Location:  Right lobe

Size:  0.6 x 0.5 x 0.6   cm.  

Composition:  Cystic  

Echogenicity:  Anechoic   

Shape:  wider than tall.

Margins:  Smooth

Echogenic foci:  None

Total points:  0

ACR TI-RADS category:  1

 

Nodule number:  Two

Location:  Right lobe

Size:  0.6 x 0.4 x 0.5 cm.  

Composition:  Spongiform

Echogenicity:  Hypoechoic 

Shape:  wider than tall.

Margins:  Irregular

Echogenic foci:  None 

Total points:  2 

ACR TI-RADS category:  2

 

Nodule number:  Three

Location:  Right

Size:  0.7 x 0.7 x 0.9 cm.  

Composition:   Solid

Echogenicity:  Isoechoic 

Shape:  wider than tall.

Margins:  Smooth 

Echogenic foci:  None 

Total points:  1 

ACR TI-RADS category:  3

 

Nodule number:  Four

Location:  Right

Size:  0.7 x 0.6 x 0.6 cm.  

Composition:  Cystic

Echogenicity:  Anechoic 

Shape:  wider than tall.

Margins:  Smooth 

Echogenic foci:  None 

Total points:  0 

ACR TI-RADS category:  1

 

Nodule number:  5

Location:  Left lobe

Size:  3.0 x 1.7 x 2.2 cm.  

Composition:  Solid

Echogenicity:  Hypoechoic 

Shape:  wider than tall.

Margins:  Smooth 

Echogenic foci:  None 

Total points:  4 

ACR TI-RADS category:  4

 

Nodule number:  6

Location:  Left lobe

Size:  0.8 x 0.6 x 0.9 cm.  

Composition:  Cystic

Echogenicity:  Anechoic 

Shape:  wider than tall.

Margins:  Smooth 

Echogenic foci:  None 

Total points:  0 

ACR TI-RADS category:  1

 

 

 

IMPRESSION:  

 

 

1. Lesion #5 demonstrates category 4 classification. Secondary to size, fine-needle aspiration is rec
ommended.

 

2. Lesions one and 6 are category 1 with no additional follow-up recommended.

 

3. Lesion 2 is considered category 2 with no additional follow-up recommended.

 

4. Lesion 3 is considered category 3. Secondary to size, no additional follow-up is recommended.

 

ACR TI-RADS definitions and recommendations:  

TI-RADS 1 (benign): 0 points.  FNA not needed. 

TI-RADS 2 (not suspicious): 2 points.  FNA not needed. 

TI-RADS 3 (mildly suspicious): 3 points. 

?  FNA if 2.5 cm or larger, follow up if 1.5 cm or larger (at 1, 3, and 5 years). 

TI-RADS 4 (moderately suspicious): 4-6 points.  

?  FNA if 1.5 cm or larger, follow up if 1 cm or larger (at 1, 2, 3, and 5 years).  

TI-RADS 5 (highly suspicious): 7 points or more.  

?  FNA if 1 cm or larger, follow up if 0.5 cm or larger (every year for 5 years).  

 

Reviewed by: Richa Madrigal MD on 12/11/2020 4:57 PM PST

Approved by: Richa Madrigal MD on 12/11/2020 4:57 PM PST

 

 

Station ID:  [id number]

## 2021-01-12 ENCOUNTER — HOSPITAL ENCOUNTER (OUTPATIENT)
Dept: HOSPITAL 76 - DI.WCP | Age: 86
Discharge: HOME | End: 2021-01-12
Attending: PHYSICIAN ASSISTANT
Payer: MEDICARE

## 2021-01-12 DIAGNOSIS — R60.0: Primary | ICD-10-CM

## 2021-01-12 DIAGNOSIS — N39.0: ICD-10-CM

## 2021-01-12 LAB
ANION GAP SERPL CALCULATED.4IONS-SCNC: 9 MMOL/L (ref 6–13)
BASOPHILS NFR BLD AUTO: 0 10^3/UL (ref 0–0.1)
BASOPHILS NFR BLD AUTO: 0.5 %
BUN SERPL-MCNC: 22 MG/DL (ref 6–20)
CALCIUM UR-MCNC: 9.4 MG/DL (ref 8.5–10.3)
CHLORIDE SERPL-SCNC: 103 MMOL/L (ref 101–111)
CO2 SERPL-SCNC: 22 MMOL/L (ref 21–32)
CREAT SERPLBLD-SCNC: 0.7 MG/DL (ref 0.4–1)
EOSINOPHIL # BLD AUTO: 0.2 10^3/UL (ref 0–0.7)
EOSINOPHIL NFR BLD AUTO: 2.5 %
ERYTHROCYTE [DISTWIDTH] IN BLOOD BY AUTOMATED COUNT: 14.9 % (ref 12–15)
GLUCOSE SERPL-MCNC: 116 MG/DL (ref 70–100)
HGB UR QL STRIP: 9.2 G/DL (ref 12–16)
LYMPHOCYTES # SPEC AUTO: 1.5 10^3/UL (ref 1.5–3.5)
LYMPHOCYTES NFR BLD AUTO: 25.3 %
MCH RBC QN AUTO: 29 PG (ref 27–31)
MCHC RBC AUTO-ENTMCNC: 31.6 G/DL (ref 32–36)
MCV RBC AUTO: 91.8 FL (ref 81–99)
MONOCYTES # BLD AUTO: 0.8 10^3/UL (ref 0–1)
MONOCYTES NFR BLD AUTO: 13 %
NEUTROPHILS # BLD AUTO: 3.5 10^3/UL (ref 1.5–6.6)
NEUTROPHILS # SNV AUTO: 6 X10^3/UL (ref 4.8–10.8)
NEUTROPHILS NFR BLD AUTO: 58.5 %
PDW BLD AUTO: 10.7 FL (ref 7.9–10.8)
PLATELET # BLD: 310 10^3/UL (ref 130–450)
RBC MAR: 3.17 10^6/UL (ref 4.2–5.4)
SODIUM SERPLBLD-SCNC: 134 MMOL/L (ref 135–145)

## 2021-01-12 PROCEDURE — 87086 URINE CULTURE/COLONY COUNT: CPT

## 2021-01-12 PROCEDURE — 80048 BASIC METABOLIC PNL TOTAL CA: CPT

## 2021-01-12 PROCEDURE — 36415 COLL VENOUS BLD VENIPUNCTURE: CPT

## 2021-01-12 PROCEDURE — 85025 COMPLETE CBC W/AUTO DIFF WBC: CPT

## 2021-01-12 NOTE — XRAY REPORT
PROCEDURE:  Chest 2 View X-Ray

 

INDICATIONS:  EDEMA

 

TECHNIQUE:  2 view(s) of the chest.  

 

COMPARISON:  None.

 

FINDINGS:  

 

Surgical changes and devices: Left-sided pacer.

 

Lungs and pleura:  No pleural effusions or pneumothorax.  Lungs are clear.  

 

Mediastinum:  Mediastinal contours are normal.  Heart size is normal.  

 

Bones and chest wall:  No suspicious bony abnormalities.  Soft tissues appear unremarkable.  

 

IMPRESSION:  No acute process.

 

Reviewed by: Nghia Ochoa MD on 1/12/2021 2:58 PM PST

Approved by: Nghia Ochoa MD on 1/12/2021 2:58 PM PST

 

 

Station ID:  [id number]

## 2021-01-13 NOTE — EXTERNAL MEDICAL SUMMARY RPT
Continuity of Care Document

                           Created on:2021



Patient:AZEB THOMAS

Sex:Female

:1933

External Reference #:[id number]





Demographics







                          Phone                     Unavailable

 

                          Preferred Language        English

 

                          Marital Status            Unknown

 

                          Yazidi Affiliation     Unknown

 

                          Race                      Unknown

 

                          Ethnic Group              Unknown









Author







                          Organization              [facility]

 

                          Address                    [place] [id number]

 

                          Phone                     [phone]









Support







                Name            Relationship    Address         Phone

 

                RETI            Unavailable     Unavailable     Unavailable









Care Team Providers







                    Name                Role                Phone

 

                    [place]            Unavailable         Unavailable

 

                    ARNP                Unavailable         Unavailable

 

                    Natalie              Unavailable         Unavailable

 

                    WINDE               Unavailable         Unavailable









Problems







                     date                description         facility

 

                     2014-10-12 09:03    HYPERTENSION NOS    [facility]

 

                     2014-10-12 09:03    SPRAIN HIP   THIGH NEC  [facility]

 

                     2014-10-12 09:03    HIP   THIGH INJURY NOS  [facility]

 

                     2014-10-12 09:03    OTHER EXTERNAL CAUSE STATUS  [facility]

 

                     2014-10-12 09:03    OTHER ACTIVITY INVG OTHER MUSCLE  [facility]



                                        STRENGTHENING EXERCISES 

 

                     2015 11:38    OTH SCREEN MAMMO-MALIGN NEOPLASM  [facility]



                                        OF BREAST           

 

                     2015 11:40    BENIGN HYPERTENSION  [facility]

 

                     2015 13:43    LOC OSTEOARTH NOS-HAND  [facility]

 

                     2015 13:43    BONE   CARTILAGE DIS NOS  [facility]

 

                     2015-10-07 18:02    ACUTE POSTHEMORRHAGIC ANEMIA  [facility]

 

                     2015-10-07 18:02    HYPO-OSMOLALITY AND HYPONATREMIA  [facility]

 

                     2015-10-07 18:02    UNSPECIFIED DEMENTIA WITHOUT  [facility]



                                        BEHAVIORAL DISTURBANCE 

 

                     2015-10-07 18:02    OTHER CHRONIC PAIN  [facility]

 

                     2015-10-07 18:02    ESSENTIAL (PRIMARY) HYPERTENSION  [facility]

 

                     2015-10-07 18:02    OTHER CONSTIPATION  [facility]

 

                     2015-10-07 18:02    UNSPECIFIED OSTEOARTHRITIS,  [facility]



                                        UNSPECIFIED SITE    

 

                     2015-10-07 18:02    UNSP INTRACAPSULAR FRACTURE OF  [facility]



                                        RIGHT FEMUR, INIT FOR CLOS FX 

 

                     2015-10-07 18:02    ADVERSE EFFECT OF OTHER OPIOIDS,  [facility]



                                        INITIAL ENCOUNTER   

 

                     2015-10-07 18:02    FALL SAME LEV FROM SLIP/TRIP W/O  [facility]



                                        STRIKE AGAINST OBJECT, INIT 

 

                     2015-10-07 18:02    OTH PLACE IN UNS NON-formerly [facility]



                                        (PRIVATE) RESIDENCE AS PLACE 

 

                     2015-10-07 18:02    UNSP PLACE IN HOSPITAL AS PLACE  [facility]

 

                     2015-10-07 18:02    LONG TERM (CURRENT) USE OF  [facility]



                                        ASPIRIN             

 

                     2015-10-07 18:02    PERSONAL HISTORY OF PULMONARY  [facility]



                                        EMBOLISM            

 

                     2015-10-07 18:02    PERSONAL HISTORY OF OTHER VENOUS  [facility]



                                        THROMBOSIS AND EMBOLISM 

 

                     2015-10-07 18:02    PERSONAL HISTORY OF NICOTINE  [facility]



                                        DEPENDENCE          

 

                     2015-10-07 18:02    PRESENCE OF CARDIAC PACEMAKER  [facility]

 

                     2015-10-22 10:30    ESSENTIAL (PRIMARY) HYPERTENSION  [facility]

 

                     2015-10-22 10:30    PRIMARY GENERALIZED  [facility]



                                        (OSTEO)ARTHRITIS    

 

                     2015 11:50    IRON DEFICIENCY ANEMIA,  [facility]



                                        UNSPECIFIED         

 

                     2015 11:41    ESSENTIAL (PRIMARY) HYPERTENSION  [facility]

 

                     2016 09:55    ESSENTIAL (PRIMARY) HYPERTENSION  [facility]

 

                     2016 09:55    UNSPECIFIED ATRIAL FIBRILLATION  [facility]

 

                     2016 09:55    PALMAR FASCIAL FIBROMATOSIS  [facility]



                                        [GIO]         

 

                     2016 09:00    IRON DEFICIENCY ANEMIA,  [facility]



                                        UNSPECIFIED         

 

                     2016 09:00    ESSENTIAL (PRIMARY) HYPERTENSION  [facility]

 

                     2016-10-28 13:06    ENCNTR SCREEN MAMMOGRAM FOR  [facility]



                                        MALIGNANT NEOPLASM OF BREAST 

 

                     2016 09:10    HYPO-OSMOLALITY AND HYPONATREMIA  [facility]

 

                     2016 09:10    ESSENTIAL (PRIMARY) HYPERTENSION  [facility]

 

                     2016 09:52    ESSENTIAL (PRIMARY) HYPERTENSION  [facility]

 

                     2016 09:52    PAIN IN RIGHT HIP   [facility]

 

                     2016 09:52    OTH FRACTURE OF RIGHT  [facility]

dical [facility]a



                                        ACETABULUM, INIT FOR CLOS FX 

 

                     2016 09:52    FALL SAME LEV FROM SLIP/TRIP W/O  [facility]



                                        STRIKE AGAINST OBJECT, INIT 

 

                     2016 09:52    LONG TERM (CURRENT) USE OF  [facility]



                                        ASPIRIN             

 

                     2016 09:52    PERSONAL HISTORY OF PULMONARY  [facility]



                                        EMBOLISM            

 

                     2016 09:52    PERSONAL HISTORY OF OTHER VENOUS  [facility]



                                        THROMBOSIS AND EMBOLISM 

 

                     2016 09:52    PRESENCE OF CARDIAC PACEMAKER  [facility]

 

                     2016 09:52    PRESENCE OF RIGHT ARTIFICIAL HIP  [facility]



                                        JOINT               

 

                     2016 09:52    PRESENCE OF UNSPECIFIED  [facility]



                                        ARTIFICIAL KNEE JOINT 

 

                     2017 10:48    OTHER VISUAL DISTURBANCES  [facility]

 

                     2017 10:48    ESSENTIAL (PRIMARY) HYPERTENSION  [facility]

 

                     2017 10:48    ACUTE CYSTITIS WITHOUT HEMATURIA  [facility]

 

                     2017 10:48    DIZZINESS AND GIDDINESS  [facility]

 

                     2017 10:48    HEADACHE            [facility]

 

                     2017 10:48    LONG TERM (CURRENT) USE OF  [facility]



                                        ASPIRIN             

 

                     2017 10:48    PERSONAL HISTORY OF PULMONARY  [facility]



                                        EMBOLISM            

 

                     2017 10:48    PERSONAL HISTORY OF OTHER VENOUS  [facility]



                                        THROMBOSIS AND EMBOLISM 

 

                     2017 10:48    PRESENCE OF CARDIAC PACEMAKER  [facility]

 

                     2017 09:10    HYPO-OSMOLALITY AND HYPONATREMIA  [facility]

 

                     2017 09:10    ESSENTIAL (PRIMARY) HYPERTENSION  [facility]

 

                     2017 09:10    UNSPECIFIED ATRIAL FIBRILLATION  [facility]

 

                     2017 09:10    SICK SINUS SYNDROME  [facility]

 

                     2017 20:42    HYPO-OSMOLALITY AND HYPONATREMIA  [facility]

 

                     2017 20:42    ENCOUNTER FOR THERAPEUTIC DRUG  [facility]



                                        LEVEL MONITORING    

 

                     2017 20:42    OTHER LONG TERM (CURRENT) DRUG  [facility]



                                        THERAPY             

 

                     2018 08:00    HYPO-OSMOLALITY AND HYPONATREMIA  [facility]

 

                     2018 08:00    SICK SINUS SYNDROME  [facility]

 

                     2018 08:00    DIZZINESS AND GIDDINESS  [facility]

 

                     2018 08:00    IRON DEFICIENCY ANEMIA,  [facility]



                                        UNSPECIFIED         

 

                     2018 08:00    HYPO-OSMOLALITY AND HYPONATREMIA  [facility]

 

                     2018 10:23    HYPO-OSMOLALITY AND HYPONATREMIA  [facility]

 

                     2018 10:23    ESSENTIAL (PRIMARY) HYPERTENSION  [facility]

 

                     2018 10:23    UNSPECIFIED ATRIAL FIBRILLATION  [facility]

 

                     2018 10:23    DIZZINESS AND GIDDINESS  [facility]

 

                     2018 10:23    PERSONAL HISTORY OF PULMONARY  [facility]



                                        EMBOLISM            

 

                     2018 10:23    PERSONAL HISTORY OF  [facility]



                                        THROMBOPHLEBITIS    

 

                     2018 10:23    PRESENCE OF CARDIAC PACEMAKER  [facility]

 

                     2018-05-15 08:00    HYPO-OSMOLALITY AND HYPONATREMIA  [facility]

 

                     2018-05-15 08:00    ESSENTIAL (PRIMARY) HYPERTENSION  [facility]

 

                     2018-05-15 08:00    UNSPECIFIED ATRIAL FIBRILLATION  [facility]

 

                     2018 08:00    IRON DEFICIENCY ANEMIA,  [facility]



                                        UNSPECIFIED         

 

                     2018 08:00    HYPO-OSMOLALITY AND HYPONATREMIA  [facility]

 

                     2018 08:00    ESSENTIAL (PRIMARY) HYPERTENSION  [facility]

 

                     2018 08:00    DIZZINESS AND GIDDINESS  [facility]

 

                     2019 13:48    ENCNTR SCREEN MAMMOGRAM FOR  [facility]



                                        MALIGNANT NEOPLASM OF BREAST 

 

                     2019 14:48    PAIN IN UNSPECIFIED JOINT  [facility]

 

                     2019 14:48    OTHER SPECIFIED ABNORMAL  [facility]



                                        IMMUNOLOGICAL FINDINGS IN SERUM 

 

                     2019 15:06    PRIMARY OSTEOARTHRITIS, RIGHT  [facility]



                                        ANKLE AND FOOT      

 

                     2019 15:06    PRIMARY OSTEOARTHRITIS, LEFT  [facility]



                                        ANKLE AND FOOT      

 

                     2019 15:06    BUNION OF RIGHT FOOT  [facility]

 

                     2019 15:06    PAIN IN UNSPECIFIED JOINT  [facility]

 

                     2019 15:06    OTHER SPECIFIED ABNORMAL  [facility]



                                        IMMUNOLOGICAL FINDINGS IN SERUM 

 

                     2019 13:36    HEMORRHAGE, NOT ELSEWHERE  [facility]



                                        CLASSIFIED          

 

                     2019 13:36    PROC/TRTMT NOT CRD OUT D/T PT LV  [facility]



                                        BEF SEEN BY [facility] CARE PROV 

 

                     2019 13:56    HEMORRHAGE, NOT ELSEWHERE  [facility]



                                        CLASSIFIED          

 

                     2020 00:00    OTHER LONG TERM (CURRENT) DRUG  [facility]



                                        THERAPY             

 

                     2020 10:21    SPONDYLOLISTHESIS, CERVICAL  [facility]



                                        REGION              

 

                     2020 10:21    SPONDYLOSIS W/O MYELOPATHY OR  [facility]



                                        RADICULOPATHY, CERVICAL REGION 

 

                     2020 10:21    OTHER CERVICAL DISC  [facility]



                                        DEGENERATION, HIGH CERVICAL 



                                        REGION              

 

                     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



                                        DIFFUSE (ENDEMIC) GOITER 

 

                     2020 12:04    OTHER SP[facility]OSIS, CERVICAL  [facility]



                                        REGION              

 

                     2020 12:04    SPINAL STENOSIS, CERVICAL REGION  [facility]

 

                     2020 12:04    OTHER CERVICAL DISC DEGENERATION  [facility]



                                        AT C4-C5 LEVEL      

 

                     2020 00:00:00  Nontoxic uninodular goiter  [facility]



                                                            [facility]

 

                     2020 00:00:00  US THYROID/NECK     [facility]



                                                            [facility]

 

                     2020 00:00:00  Nontoxic single thyroid nodule  [facility]



                                                            [facility]

 

                     2020 00:00:00  [facility]



                                                            [facility]

 

                     2020 00:00:00  TSH WITH REFLEX TO FT4  [facility] 

Primary Care



                                                            [facility]

 

                     2020 00:00:00  COMPREHENSIVE METABOLIC PANEL  [facility]

 

                     2020 00:00:00  Urinalysis with Microscopic  [facility]



                                        Exam, Culture in Indicated [facility]

 

                     2020 00:00:00  CBC W/Diff/Plt      [facility]

 

                     2020 00:00:00  Mild cognitive impairment, so  [facility]



                                        stated              [facility]

 

                     2020 00:00:00  Mild cognitive disorder  [facility]

 

                     2020 00:00    NONTOXIC SINGLE THYROID NODULE  [facility]

 

                     2020 00:00    HYPO-OSMOLALITY AND HYPONATREMIA  [facility]

 

                     2020 00:00    OTHER LONG TERM (CURRENT) DRUG  [facility]



                                        THERAPY             

 

                     2020 08:00    NONTOXIC SINGLE THYROID NODULE  [facility]

 

                     2020 08:00    HYPO-OSMOLALITY AND HYPONATREMIA  [facility]

 

                     2020 08:00    OTHER LONG TERM (CURRENT) DRUG  [facility]



                                        THERAPY             

 

                     2020 08:00    NONTOXIC SINGLE THYROID NODULE  [facility]

 

                     2020 08:00    HYPO-OSMOLALITY AND HYPONATREMIA  [facility]

 

                     2020 08:00    OTHER LONG TERM (CURRENT) DRUG  [facility]



                                        THERAPY             

 

                     2020 00:00    NONTOXIC SINGLE THYROID NODULE  [facility]

 

                     2020 00:00    HYPO-OSMOLALITY AND HYPONATREMIA  [facility]

 

                     2020 00:00    OTHER LONG TERM (CURRENT) DRUG  [facility]



                                        THERAPY             

 

                     2020 00:00:00  Unspecified disorder of kidney  [facility]



                                        and ureter          [facility]

 

                     2020 00:00:00  COMPREHENSIVE METABOLIC PANEL  [facility]

 

                     2020 00:00:00  Disorder of kidney and ureter,  [facility]



                                        unspecified         [facility]

 

                     2020 00:00:00  Renal failure syndrome  [facility] 

Primary Care



                                                            [facility]

 

                     2020 00:00:00  FNA BX w/US GND 1ST LES  [facility]

 Primary Care



                                                            [facility]

 

                     2020 00:00:00  [facility] Microscopic  [facility]



                                        Exam, Culture in Indicated [facility]

 

                     2020 00:00:00  Acute cystitis with hematuria  [facility]



                                                            [facility]

 

                     2020 00:00:00  Acute urinary tract infection  [facility]



                                                            [facility]

 

                     2021 00:00    URINARY TRACT INFECTION, SITE  [facility]



                                        NOT SPECIFIED       

 

                     2021 00:00    EDEMA, UNSPECIFIED  [facility]

al Center







Allergies







                     date                description         facility

 

                                         CEFACLOR            [facility]

al Center

 

                                         CODEINE             [facility]

al Center

 

                                         LISINOPRIL          [facility]

al Center

 

                                         SULFAMETHOXAZOLE W/TRIMETHOPRIM  [facility]



                                        (CO-TRIMOXAZOLE)    

 

                                         SULFA ANTIBIOTICS   [facility]

al Center

 

                                         OTHER               [facility]

al Center

 

                                         SULFA (SULFONAMIDE ANTIBIOTICS)  [facility]

 

                                         NO KNOWN ALLERGIES  [facility]

al Center

 

                                         VARENICLINE         [facility]

al Center

 

                                         CODEINE             [facility]

al Center

 

                                         DULAGLUTIDE         [facility]

al Center

 

                                         PREDNISONE          [facility]

al Center

 

                                         shellfish derived   [facility]

al Center

 

                                         LISINOPRIL          [facility]

al Center

 

                                         NO KNOWN ALLERGIES  [facility]

al [facility]

 

                                         NO KNOWN ENVIRONMENTAL ALLERGIES  [facility]

 

                                         NO KNOWN ALLERGIES  [facility]

al Center

 

                                         No Known Drug Allergies  [facility]







Medications







                     date                description         facility

 

                     2020 00:00:00  null                [facility]

[facility]

 

                     2020 00:00:00  null                [facility]

[facility]

 

                     2020 00:00:00  LISINOPRIL          [facility]

[facility]

 

                     2020 00:00:00  LISINOPRIL          [facility]

[facility]

 

                     2020 00:00:00  null                [facility]

[facility]

 

                     2020 00:00:00  null                [facility]

[facility]

 

                     2020 00:00:00  C[facility]ROFLOXACIN HCL   [facility]

[facility]

 

                     2020 00:00:00  CIPROFLOXACIN HCL   [facility]

[facility]







Procedures







                     date                description         facility

 

                     2020 00:00:00  US THYROID/NECK     [facility]

[facility]









                     date                description         facility

 

                     2020 00:00:00                      [facility]

[facility]









                     date                description         facility

 

                     2020 00:00:00  TSH WITH REFLEX TO FT4  [facility] 

[facility]









                     date                description         facility

 

                     2020 00:00:00  COMPREHENSIVE METABOLIC PANEL  [facility]



                                                            [facility]









                     date                description         facility

 

                     2020 00:00:00  Urinalysis with Microscopic  [facility]



                                        Exam, Culture in Indicated [facility]









                     date                description         facility

 

                     2020 00:00:00  CBC W/Diff/Plt      [facility]

[facility]









                     date                description         facility

 

                     2020 00:00:00                      [facility]

[facility]







Results





Social History







                     date                description         facility

 

                     [phone]

## 2021-01-14 ENCOUNTER — HOSPITAL ENCOUNTER (OUTPATIENT)
Dept: HOSPITAL 76 - LAB.WCP | Age: 86
Discharge: HOME | End: 2021-01-14
Attending: PHYSICIAN ASSISTANT
Payer: MEDICARE

## 2021-01-14 DIAGNOSIS — D64.9: Primary | ICD-10-CM

## 2021-01-14 LAB
FERRITIN SERPL-MCNC: 13.3 NG/ML (ref 11–306.8)
FOLATE SERPL-MCNC: 29 NG/ML
IRON SATN MFR SERPL: 6 % (ref 20–50)
IRON SERPL-MCNC: 31 UG/DL (ref 28–170)
TIBC SERPL-MCNC: 532 UG/DL (ref 250–450)
TRANSFERRIN SERPL-MCNC: 380 MG/DL (ref 192–382)
VIT B12 SERPL-MCNC: 263 PG/ML (ref 180–914)

## 2021-01-14 PROCEDURE — 84466 ASSAY OF TRANSFERRIN: CPT

## 2021-01-14 PROCEDURE — 82728 ASSAY OF FERRITIN: CPT

## 2021-01-14 PROCEDURE — 36415 COLL VENOUS BLD VENIPUNCTURE: CPT

## 2021-01-14 PROCEDURE — 82607 VITAMIN B-12: CPT

## 2021-01-14 PROCEDURE — 83540 ASSAY OF IRON: CPT

## 2021-01-14 PROCEDURE — 82746 ASSAY OF FOLIC ACID SERUM: CPT

## 2021-01-28 ENCOUNTER — HOSPITAL ENCOUNTER (OUTPATIENT)
Dept: HOSPITAL 76 - LAB.R | Age: 86
Discharge: HOME | End: 2021-01-28
Attending: PHYSICIAN ASSISTANT
Payer: MEDICARE

## 2021-01-28 DIAGNOSIS — D64.9: Primary | ICD-10-CM

## 2021-01-28 PROCEDURE — 82274 ASSAY TEST FOR BLOOD FECAL: CPT

## 2021-06-10 ENCOUNTER — HOSPITAL ENCOUNTER (INPATIENT)
Dept: HOSPITAL 76 - ED | Age: 86
LOS: 2 days | Discharge: HOME | DRG: 313 | End: 2021-06-12
Attending: INTERNAL MEDICINE | Admitting: SPECIALIST
Payer: MEDICARE

## 2021-06-10 ENCOUNTER — HOSPITAL ENCOUNTER (OUTPATIENT)
Dept: HOSPITAL 76 - EMS | Age: 86
Discharge: ANOTHER HOSPITAL | End: 2021-06-10
Payer: MEDICARE

## 2021-06-10 DIAGNOSIS — F02.80: ICD-10-CM

## 2021-06-10 DIAGNOSIS — Z86.74: ICD-10-CM

## 2021-06-10 DIAGNOSIS — G30.1: ICD-10-CM

## 2021-06-10 DIAGNOSIS — Z87.891: ICD-10-CM

## 2021-06-10 DIAGNOSIS — N28.1: ICD-10-CM

## 2021-06-10 DIAGNOSIS — I11.9: ICD-10-CM

## 2021-06-10 DIAGNOSIS — R53.1: ICD-10-CM

## 2021-06-10 DIAGNOSIS — I25.2: ICD-10-CM

## 2021-06-10 DIAGNOSIS — R77.8: ICD-10-CM

## 2021-06-10 DIAGNOSIS — I44.7: ICD-10-CM

## 2021-06-10 DIAGNOSIS — Z86.718: ICD-10-CM

## 2021-06-10 DIAGNOSIS — J43.9: ICD-10-CM

## 2021-06-10 DIAGNOSIS — R07.89: Primary | ICD-10-CM

## 2021-06-10 DIAGNOSIS — Z95.0: ICD-10-CM

## 2021-06-10 DIAGNOSIS — Z79.899: ICD-10-CM

## 2021-06-10 DIAGNOSIS — E87.1: ICD-10-CM

## 2021-06-10 DIAGNOSIS — M81.0: ICD-10-CM

## 2021-06-10 DIAGNOSIS — I48.91: ICD-10-CM

## 2021-06-10 DIAGNOSIS — N17.9: ICD-10-CM

## 2021-06-10 DIAGNOSIS — M54.2: ICD-10-CM

## 2021-06-10 DIAGNOSIS — Z20.822: ICD-10-CM

## 2021-06-10 DIAGNOSIS — R42: Primary | ICD-10-CM

## 2021-06-10 DIAGNOSIS — I10: ICD-10-CM

## 2021-06-10 DIAGNOSIS — N30.00: ICD-10-CM

## 2021-06-10 DIAGNOSIS — Z86.711: ICD-10-CM

## 2021-06-10 DIAGNOSIS — K76.89: ICD-10-CM

## 2021-06-10 DIAGNOSIS — R25.2: ICD-10-CM

## 2021-06-10 LAB
ALBUMIN DIAFP-MCNC: 4.2 G/DL (ref 3.2–5.5)
ALBUMIN/GLOB SERPL: 1.6 {RATIO} (ref 1–2.2)
ALP SERPL-CCNC: 49 IU/L (ref 42–121)
ALT SERPL W P-5'-P-CCNC: 15 IU/L (ref 10–60)
ANION GAP SERPL CALCULATED.4IONS-SCNC: 10 MMOL/L (ref 6–13)
AST SERPL W P-5'-P-CCNC: 23 IU/L (ref 10–42)
B PARAPERT DNA SPEC QL NAA+PROBE: NOT DETECTED
B PERT DNA SPEC QL NAA+PROBE: NOT DETECTED
BASOPHILS NFR BLD AUTO: 0 10^3/UL (ref 0–0.1)
BASOPHILS NFR BLD AUTO: 0.5 %
BILIRUB BLD-MCNC: 0.7 MG/DL (ref 0.2–1)
BUN SERPL-MCNC: 31 MG/DL (ref 6–20)
C PNEUM DNA NPH QL NAA+NON-PROBE: NOT DETECTED
CALCIUM UR-MCNC: 9.7 MG/DL (ref 8.5–10.3)
CHLORIDE SERPL-SCNC: 98 MMOL/L (ref 101–111)
CLARITY UR REFRACT.AUTO: CLEAR
CO2 SERPL-SCNC: 23 MMOL/L (ref 21–32)
CREAT SERPLBLD-SCNC: 1.2 MG/DL (ref 0.4–1)
EOSINOPHIL # BLD AUTO: 0.2 10^3/UL (ref 0–0.7)
EOSINOPHIL NFR BLD AUTO: 3.3 %
ERYTHROCYTE [DISTWIDTH] IN BLOOD BY AUTOMATED COUNT: 19.3 % (ref 12–15)
FLUAV RNA RESP QL NAA+PROBE: NOT DETECTED
GFRSERPLBLD MDRD-ARVRAT: 42 ML/MIN/{1.73_M2} (ref 89–?)
GLOBULIN SER-MCNC: 2.7 G/DL (ref 2.1–4.2)
GLUCOSE SERPL-MCNC: 110 MG/DL (ref 70–100)
GLUCOSE UR QL STRIP.AUTO: NEGATIVE MG/DL
HAEM INFLU B DNA SPEC QL NAA+PROBE: NOT DETECTED
HCOV 229E RNA SPEC QL NAA+PROBE: NOT DETECTED
HCOV HKU1 RNA UPPER RESP QL NAA+PROBE: NOT DETECTED
HCOV NL63 RNA ASPIRATE QL NAA+PROBE: NOT DETECTED
HCOV OC43 RNA SPEC QL NAA+PROBE: NOT DETECTED
HCT VFR BLD AUTO: 34.1 % (ref 37–47)
HGB UR QL STRIP: 11.2 G/DL (ref 12–16)
HMPV AG SPEC QL: NOT DETECTED
HPIV1 RNA NPH QL NAA+PROBE: NOT DETECTED
HPIV2 SPEC QL CULT: NOT DETECTED
HPIV3 AB TITR SER CF: NOT DETECTED {TITER}
HPIV4 RNA SPEC QL NAA+PROBE: NOT DETECTED
KETONES UR QL STRIP.AUTO: (no result) MG/DL
LIPASE SERPL-CCNC: 46 U/L (ref 22–51)
LYMPHOCYTES # SPEC AUTO: 1.6 10^3/UL (ref 1.5–3.5)
LYMPHOCYTES NFR BLD AUTO: 26.9 %
M PNEUMO DNA SPEC QL NAA+PROBE: NOT DETECTED
MCH RBC QN AUTO: 26.1 PG (ref 27–31)
MCHC RBC AUTO-ENTMCNC: 32.8 G/DL (ref 32–36)
MCV RBC AUTO: 79.5 FL (ref 81–99)
MONOCYTES # BLD AUTO: 0.7 10^3/UL (ref 0–1)
MONOCYTES NFR BLD AUTO: 12 %
MUCOUS THREADS URNS QL MICRO: (no result)
NEUTROPHILS # BLD AUTO: 3.3 10^3/UL (ref 1.5–6.6)
NEUTROPHILS # SNV AUTO: 5.8 X10^3/UL (ref 4.8–10.8)
NEUTROPHILS NFR BLD AUTO: 57.1 %
NITRITE UR QL STRIP.AUTO: NEGATIVE
NRBC # BLD AUTO: 0 /100WBC
NRBC # BLD AUTO: 0 X10^3/UL
PDW BLD AUTO: 9 FL (ref 7.9–10.8)
PH UR STRIP.AUTO: 6 PH (ref 5–7.5)
PLATELET # BLD: 231 10^3/UL (ref 130–450)
POTASSIUM SERPL-SCNC: 4.2 MMOL/L (ref 3.5–5)
PROT SPEC-MCNC: 6.9 G/DL (ref 6.7–8.2)
PROT UR STRIP.AUTO-MCNC: NEGATIVE MG/DL
RBC # UR STRIP.AUTO: NEGATIVE /UL
RBC # URNS HPF: (no result) /HPF (ref 0–5)
RBC MAR: 4.29 10^6/UL (ref 4.2–5.4)
RSV RNA RESP QL NAA+PROBE: NOT DETECTED
RV+EV RNA SPEC QL NAA+PROBE: NOT DETECTED
SARS-COV-2 RNA PNL SPEC NAA+PROBE: NOT DETECTED
SODIUM SERPLBLD-SCNC: 131 MMOL/L (ref 135–145)
SP GR UR STRIP.AUTO: 1.02 (ref 1–1.03)
SQUAMOUS URNS QL MICRO: (no result)
UROBILINOGEN UR QL STRIP.AUTO: (no result) E.U./DL
UROBILINOGEN UR STRIP.AUTO-MCNC: NEGATIVE MG/DL

## 2021-06-10 PROCEDURE — 84484 ASSAY OF TROPONIN QUANT: CPT

## 2021-06-10 PROCEDURE — 76770 US EXAM ABDO BACK WALL COMP: CPT

## 2021-06-10 PROCEDURE — 81003 URINALYSIS AUTO W/O SCOPE: CPT

## 2021-06-10 PROCEDURE — 99284 EMERGENCY DEPT VISIT MOD MDM: CPT

## 2021-06-10 PROCEDURE — 81001 URINALYSIS AUTO W/SCOPE: CPT

## 2021-06-10 PROCEDURE — 78452 HT MUSCLE IMAGE SPECT MULT: CPT

## 2021-06-10 PROCEDURE — 93005 ELECTROCARDIOGRAM TRACING: CPT

## 2021-06-10 PROCEDURE — 71275 CT ANGIOGRAPHY CHEST: CPT

## 2021-06-10 PROCEDURE — 80048 BASIC METABOLIC PNL TOTAL CA: CPT

## 2021-06-10 PROCEDURE — 93017 CV STRESS TEST TRACING ONLY: CPT

## 2021-06-10 PROCEDURE — 85025 COMPLETE CBC W/AUTO DIFF WBC: CPT

## 2021-06-10 PROCEDURE — 99285 EMERGENCY DEPT VISIT HI MDM: CPT

## 2021-06-10 PROCEDURE — 36415 COLL VENOUS BLD VENIPUNCTURE: CPT

## 2021-06-10 PROCEDURE — 85379 FIBRIN DEGRADATION QUANT: CPT

## 2021-06-10 PROCEDURE — 71045 X-RAY EXAM CHEST 1 VIEW: CPT

## 2021-06-10 PROCEDURE — 96374 THER/PROPH/DIAG INJ IV PUSH: CPT

## 2021-06-10 PROCEDURE — 87086 URINE CULTURE/COLONY COUNT: CPT

## 2021-06-10 PROCEDURE — 76705 ECHO EXAM OF ABDOMEN: CPT

## 2021-06-10 PROCEDURE — 0202U NFCT DS 22 TRGT SARS-COV-2: CPT

## 2021-06-10 PROCEDURE — 80053 COMPREHEN METABOLIC PANEL: CPT

## 2021-06-10 PROCEDURE — 83690 ASSAY OF LIPASE: CPT

## 2021-06-10 PROCEDURE — 87631 RESP VIRUS 3-5 TARGETS: CPT

## 2021-06-10 RX ADMIN — SODIUM CHLORIDE, PRESERVATIVE FREE SCH ML: 5 INJECTION INTRAVENOUS at 23:35

## 2021-06-10 RX ADMIN — SODIUM CHLORIDE, PRESERVATIVE FREE SCH ML: 5 INJECTION INTRAVENOUS at 23:32

## 2021-06-10 NOTE — HISTORY & PHYSICAL EXAMINATION
Chief Complaint





- Chief Complaint


Chief Complaint: chest pain





History of Present Illness





- Admitted From


Admitted From:: home via EMS





- History Obtained From


Records Reviewed: [facility]


History obtained from: Patient


Exam Limitations: forgetfulness





- History of Present Illness


HPI Comment/Other: 


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 

a subsequent pacemaker.  She is hypertensive, but has no history of diabetes, 

high cholesterol, family history that is contributory.  She is a remote smoker 

that smoked in her early marriage years but did not continue. She was seen for 

fears of stroke in 2018 in the ER and can't do MRI or CTA (dye allergy) so 

carotids, echo done.  ER MD also noted: "also obtained records from [facility] 

and pt had a myocardial perfusion scan within last 6 months or so that showed no

reversible ischemia and was interpreted as "not high risk"





Addendum entered and electronically signed by Lubna Berg MD  18 

20:02: 





given sx only lasting 2 min at a time don't feel serial trops will be useful but

do find echo to be reassuring and felt pt safe to dc as her sx have completely 

resolved and did not re-occur while in the ER





Addendum entered and electronically signed by Lubna Berg MD  18 

19:48: 





but did get an echo and pt has no regional wall motion abnormalities to suggest 

ischemia - also no thrombus to suggest possible source for emboli








Addendum entered and electronically signed by Lubna Berg MD  18 

19:45: 





EKG machine read as AMI but it is a paced rhythm and unchanged from prior EKGs 

and she is not having chest pain or dyspnea"








She was taking a nap this afternoon when she was suddenly awoken from sleep with

sudden onset severe, severe central chest pain in the upper chest.  She thinks 

she was nauseated with this, and she thinks she had arm pain.  She is not quite 

sure.  This is where her forgetfulness contributes to some elements of a poor 

history.  The chest pain only lasted about 30 minutes.  By the time ambulance 

was called, it had gone away.  There is no diaphoresis, no palpitations.  No 

dizziness, headache.  She does have a history of a remote pulmonary emboli 

decades ago.  She does not remember what that felt like and does not know if 

this is the same symptom.  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 

gardens, walks his dog every day, tries to do light household chores.  She says 

there has been no change in her activity or endurance.  She denies any leg 

edema.  She is not on any hormone replacement therapy.





In the emergency room she has been hypertensive in the 170s to 180s systolic.  

She is got a paced rhythm on telemetry.  Oxygenating normally.  A delightful 

forgetful elderly female with a negative physical exam with regards to 

cardiopulmonary disease.  Her initial troponin was 28.7.  Second troponin was 

35.  She also has a new acute kidney insufficiency.  Usual creatinine is 0.7 and

she is 1.2.





She is now placed in observation for rule out MI.  I asked her that if a stress 

test was positive would she want intervention.  She promptly says yes.  If her 

cardiologist is willing to do a stent on her she would want that done.  However 

she would strongly reconsider bypass surgery.








History





- Past Medical History


Cardiovascular: reports: Hypertension, Deep vein thrombosis, Pulmonary embolism,

Atrial fibrillation (Sinus bradycardia or heart block resulting in syncope and 

collapse in her PCP office.  Status post pacemaker)


Respiratory: reports: None


Neuro: reports: Dementia (had sundowning with [facility]  admit), Fainting 

(Echocardiogram the left ventricular EF 50-55%.  Right ventricular pacing.  RV 

normal size and function.  Mild to moderate aortic regurgitation.)


Endocrine/Autoimmune: reports: None


GI: reports: None


GYN: reports: Other ()


: reports: None


HEENT: reports: None


Psych: reports: None


Musculoskeletal: reports: Osteoarthritis, Osteoporosis (fall with right hip fx, 

ORIF )


Derm: reports: Other


MRSA Hx?: No





- Past Surgical History


Ortho: reports: Knee replacement


/GYN: reports: Hysterectomy


Cardiovascular: reports: Pacemaker





- Family & Social History


Family History Comment/Other: Negative for diabetes, cancer, heart disease.  

Hypertension, however is found in parents and siblings.


Living arrangement: At home


Living Situation: With family


Social History Notes: Born in [facility], raised in [place].  Went to 

[employer] nursing school graduated.  Had 4 children overall.  

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 

[employer] to become an ARNP.  She does not remember when she 

retired.  She was  for many years to her first  and he  4 

years ago of complications of prostate cancer.  Prior to his prostate cancer 

death, however, he had had a stroke.  Her son has been living with her since 

that time.  She smoked in her early marriage, and quit in the early .  

Probably smoked 1/4 pack/day for about 5 years.  She has no history of 

recreational substance abuse or alcohol abuse.  She has 3 sons and 1 daughter.





- Substance History


Use: Uses substance without health or social issues: NONE


Abuse: Recurrent use of substance despite neg consequences: NONE


Dependence: Experiences withdrawal or developed tolerances: NONE





- POLST


Patient has POLST: No


POLST Status: Full Code





Meds/Allgy





- Home Medications


Home Medications: 


                                Ambulatory Orders











 Medication  Instructions  Recorded  Confirmed


 


Spironolactone 25 mg PO BID 10/12/14 06/10/21


 


traMADol [Ultram] 50 mg PO HS 10/12/14 06/10/21


 


Aspirin [[place] Aspirin] 81 mg PO DAILY 06/10/21 06/10/21


 


Magnesium Oxide [Magnesium] 400 mg PO DAILY 06/10/21 06/10/21


 


Nitrofurantoin Macrocrystal 50 mg PO HS 06/10/21 06/10/21





[Macrodantin]   


 


Oxybutynin Chloride [Ditropan Xl] 5 mg PO DAILY 06/10/21 06/10/21


 


dilTIAZem HCL [Diltiazem 24Hr  mg PO DAILY 06/10/21 06/10/21





(Xr)]   














- Allergies


Allergies/Adverse Reactions: 


                                    Allergies











Allergy/AdvReac Type Severity Reaction Status Date / Time


 


shellfish derived Allergy  Unknown Verified 06/10/21 18:29














Review of Systems





- Constitutional


Constitutional: denies: Fatigue, Fever, Chills, Malaise, Weakness, Poor appetite





- Eyes


Eyes: denies: Pain, Irritation, Amaurosis





- Ears, Nose & Throat


Ears, Nose & Throat: reports: Hearing loss, Vertigo.  denies: Ear pain, Hearing 

aids, Sore throat, Hoarseness





- Cardiovascular


Cariovascular: reports: Irregular heart rate, Chest pain, Lightheadedness.  

denies: Palpitations, Edema, Syncope, Exertional dyspnea, Decr. exercise 

tolerance





- Respiratory


Respiratory: denies: Cough, Sputum production, Wheezing, Snoring, SOB at rest, 

SOB with exertion





- Gastrointestinal


Gastrointestinal: reports: Nausea.  denies: Abdominal pain, Abdominal diste

ntion, Constipation, Diarrhea, Vomiting, Coffee grounds emesis, Reflux/heartburn





- Genitourinary


Genitourinary: reports: Frequency, Urgency, Incontinence.  denies: Flank pain, 

Nocturia





- Musculoskeletal


Musculoskeletal: reports: Stiffness, Joint pain.  denies: Muscle pain





- Integumentary


Integumentary: denies: Rash, Pruritis, Lesions, Dryness, Lumps





- Neurological


Neurological: reports: Memory problems.  denies: General weakness, Focal weak

ness, Headache, Dizziness, Pre-existing deficit





- Psychiatric


Psychiatric: denies: Depression, Anxiety, Suicidal, Delusions





- Endocrine


Endocrine: denies: Polyuria, Polydypsia, Polyphagia





- Hematologic/Lymphatic


Hematologic/Lymphatic: denies: Anemia, Bruising, Petechiae


Prior Level of Functionality: 





She states that she is still completely independent with ability to dress 

herself, feed herself.  She does light household chores.  She walks the dog.  

She needs help because of memory but does need help for taking care of herself.





Exam





- Vital Signs


Reviewed Vital Signs: Yes


Vital Signs: 





                                Vital Signs x48h











  Temp Pulse Pulse Resp BP BP Pulse Ox


 


 06/10/21 22:30  36.4 C L   63  18   179/105 H  98


 


 06/10/21 20:42   60   20  187/76 H   97


 


 06/10/21 18:29  36.5 C  60   16  177/57 H   98














- Physical Exam


General Appearance: positive: No acute distress, Alert, Other (Thin, delightful,

elderly female who looks her stated age, but is having memory loss.  She asked 

where she was and would occasionally get her dates mixed up in her life 

history.)


Eyes Bilateral: positive: PERRL, EOMI


ENT: positive: Other (Mildly to moderately deaf.)


Neck: positive: No JVD.  negative: Stiff neck


Respiratory: positive: No respiratory distress.  negative: Wheezes, Rales, 

Rhonchi


Cardiovascular: positive: Regular rate & rhythm, Systolic murmur, Gallop/S4 

(split S2 or S4), Other (pacer in chest wall)


Peripheral Pulses: positive: 1+


Abdomen: positive: Non-tender, No organomegaly, Nml bowel sounds, No distention


Skin: positive: Warm, Dry


Extremities: positive: Full ROM, Nml appearance, Pedal edema


Neurologic/Psychiatric: positive: CN's nml (2-12), Motor nml, Sensation nml, 

Disoriented to place





Conclusion/Plan





- Problem List


(1) Chest pain


Conclusion/Plan: 


with a hx of PE and mild bump in troponins now





Plan:


repeat troponin #3


Check D dimer.  If D dimer high, get CTA tomorrow since creat is acutely 

elevated


Lovenox 40 mg SQ x1


lipitor x 1


ASA in  am


Stress test tomorrow.  She is interested in intervention if the stress test is 

(+)


Qualifiers: 


   Chest pain type: unspecified   Qualified Code(s): R07.9 - Chest pain, 

unspecified   





(2) History of pulmonary embolism


Conclusion/Plan: 


she can't remember when exactly or what the circumstances were that led to that.







Plan:


check D dimer


If (+), check CTA after hydration for CLIFFORD 








(3) Dementia


Conclusion/Plan: 


present on exam. She has her son living with her, describes an active lifetyle. 

She still wants full resuscitation.


Qualifiers: 


   Dementia type: Alzheimer's   Alzheimer's disease onset: late-onset 





(4) HTN (hypertension), benign


Conclusion/Plan: 


hydralazine contraindicated in chest pain or active CAD.  She is on diltiazem.


Bradycardia to 60s.





I will add Norvasc for now.








(5) Hyponatremia


Conclusion/Plan: 


chronic, present on admission.  She has confusion w low sodium with her hip 

fracture, it resolved.





Plan:


IVF, recheck in am 








(6) CLIFFORD (acute kidney injury)


Conclusion/Plan: 


unclear why this should be happening.  she describes no change in po.  no new 

diuretic. 





Plan


IVF











(7) UTI (urinary tract infection)


Conclusion/Plan: 


She is on chronic suppressive therapy with nitrofurantoin.  I discussed the case

with the emergency room doctor without looking at her labs.  She does not have a

fever, white cell count is normal.  She has a small amount of leukocyte Estrace,

10-25 white cells, squamous cells, and a few bacteria.  We had initially agreed 

to start her on Macrobid.  I think at this point I will not be treating her for 

probable chronic bacteriuria in this elderly patient.


Qualifiers: 


   Urinary tract infection type: acute cystitis   Hematuria presence: without 

hematuria   Qualified Code(s): N30.00 - Acute cystitis without hematuria   





- Lab Results


Lab results reviewed: Yes


Michael Bones: 


                                 06/10/21 18:56





                                 06/10/21 18:56





Core Measures





- Anticipated LOS


I expect patient to be DC'd or transferred within 96 hours.: Yes





- DVT/VTE - Prophylaxis


VTE/DVT Device ordered at admit?: Yes

## 2021-06-10 NOTE — ED PHYSICIAN DOCUMENTATION
History of Present Illness





- Stated complaint


Stated Complaint: DIZZINESS





- Chief complaint


Chief Complaint: General





- History obtained from


History obtained from: Patient





- History of Present Illness


Timing: Today


Pain level max: 3


Pain level now: 0





- Additonal information


Additional information: 





Patient is an 88-year-old female who presents to the emergency department 

stating that she had chest pressure earlier today.  She thinks it started around

4 PM.  She states that it resolved when EMS picked her up.  Nothing made it 

better or worse.  No history of heart disease.  She states that she felt like it

was hard to breathe during this time as well.  Patient states that she does have

a history of a pacemaker but no history of acute coronary syndrome.  No history 

of stents or bypasses.





Review of Systems


Ten Systems: 10 systems reviewed and negative


Constitutional: denies: Fever, Chills


Nose: denies: Rhinorrhea / runny nose, Congestion


Respiratory: denies: Cough


GI: denies: Vomiting, Diarrhea


Skin: denies: Rash


Musculoskeletal: denies: Neck pain, Back pain


Neurologic: denies: Headache





PD PAST MEDICAL HISTORY





- Past Medical History


Cardiovascular: Hypertension, Deep vein thrombosis, Pulmonary embolism, Atrial 

fibrillation





- Past Surgical History


Past Surgical History: Yes


Ortho: Knee replacement


/GYN: Hysterectomy


Cardiovascular: Pacemaker





- Present Medications


Home Medications: 


                                Ambulatory Orders











 Medication  Instructions  Recorded  Confirmed


 


Spironolactone 25 mg PO BID 10/12/14 06/10/21


 


traMADol [Ultram] 50 mg PO HS 10/12/14 06/10/21


 


Aspirin [[place] Aspirin] 81 mg PO DAILY 06/10/21 06/10/21


 


Magnesium Oxide [Magnesium] 400 mg PO DAILY 06/10/21 06/10/21


 


Nitrofurantoin Macrocrystal 50 mg PO HS 06/10/21 06/10/21





[Macrodantin]   


 


Oxybutynin Chloride [Ditropan Xl] 5 mg PO DAILY 06/10/21 06/10/21


 


dilTIAZem HCL [Diltiazem 24Hr  mg PO DAILY 06/10/21 06/10/21





(Xr)]   














- Allergies


Allergies/Adverse Reactions: 


                                    Allergies











Allergy/AdvReac Type Severity Reaction Status Date / Time


 


shellfish derived Allergy  Unknown Verified 06/10/21 18:29














- Social History


Does the pt smoke?: No


Smoking Status: Never smoker


Does the pt drink ETOH?: No


Does the pt have substance abuse?: No





- Immunizations


Immunizations are current?: Yes





PD ED PE NORMAL





- Vitals


Vital signs reviewed: Yes





- General


General: Alert and oriented X 3, No acute distress, Well developed/nourished





- HEENT


HEENT: PERRL, Moist mucous membranes





- Neck


Neck: Supple, no meningeal sign





- Cardiac


Cardiac: RRR, Strong equal pulses





- Respiratory


Respiratory: No respiratory distress, Clear bilaterally





- Abdomen


Abdomen: Soft, Non tender, Non distended





- Derm


Derm: Warm and dry





- Extremities


Extremities: No edema, No calf tenderness / cord





- Neuro


Neuro: Alert and oriented X 3





- Psych


Psych: Normal mood, Normal affect





Results





- Vitals


Vitals: 


                               Vital Signs - 24 hr











  06/10/21 06/10/21





  18:29 20:42


 


Temperature 36.5 C 


 


Heart Rate 60 60


 


Respiratory 16 20





Rate  


 


Blood Pressure 177/57 H 187/76 H


 


O2 Saturation 98 97








                                     Oxygen











O2 Source [With Activity]      Room air


 


O2 Source [Without Activity]   Room air


 


O2 Source                      Room air

















- EKG (time done)


  ** 1849


Rate: Rate (enter#) (60)


Rhythm: Paced





- Labs


Labs: 


                                Laboratory Tests











  06/10/21 06/10/21 06/10/21





  18:56 18:56 18:56


 


WBC  5.8  


 


RBC  4.29  


 


Hgb  11.2 L  


 


Hct  34.1 L  


 


MCV  79.5 L  


 


MCH  26.1 L  


 


MCHC  32.8  


 


RDW  19.3 H  


 


Plt Count  231  


 


MPV  9.0  


 


Neut # (Auto)  3.3  


 


Lymph # (Auto)  1.6  


 


Mono # (Auto)  0.7  


 


Eos # (Auto)  0.2  


 


Baso # (Auto)  0.0  


 


Absolute Nucleated RBC  0.00  


 


Nucleated RBC %  0.0  


 


Sodium   131 L 


 


Potassium   4.2 


 


Chloride   98 L 


 


Carbon Dioxide   23 


 


Anion Gap   10.0 


 


BUN   31 H 


 


Creatinine   1.2 H 


 


Estimated GFR (MDRD)   42 L 


 


Glucose   110 H 


 


Calcium   9.7 


 


Total Bilirubin   0.7 


 


AST   23 


 


ALT   15 


 


Alkaline Phosphatase   49 


 


Troponin I High Sens    28.7 H*


 


Total Protein   6.9 


 


Albumin   4.2 


 


Globulin   2.7 


 


Albumin/Globulin Ratio   1.6 


 


Lipase   46 


 


Urine Color   


 


Urine Clarity   


 


Urine pH   


 


Ur Specific Gravity   


 


Urine Protein   


 


Urine Glucose (UA)   


 


Urine Ketones   


 


Urine Occult Blood   


 


Urine Nitrite   


 


Urine Bilirubin   


 


Urine Urobilinogen   


 


Ur Leukocyte Esterase   


 


Urine RBC   


 


Urine WBC   


 


Ur Squamous Epith Cells   


 


Urine Bacteria   


 


Urine Mucus   


 


Ur Microscopic Review   


 


Urine Culture Comments   














  06/10/21 06/10/21





  20:06 20:49


 


WBC  


 


RBC  


 


Hgb  


 


Hct  


 


MCV  


 


MCH  


 


MCHC  


 


RDW  


 


Plt Count  


 


MPV  


 


Neut # (Auto)  


 


Lymph # (Auto)  


 


Mono # (Auto)  


 


Eos # (Auto)  


 


Baso # (Auto)  


 


Absolute Nucleated RBC  


 


Nucleated RBC %  


 


Sodium  


 


Potassium  


 


Chloride  


 


Carbon Dioxide  


 


Anion Gap  


 


BUN  


 


Creatinine  


 


Estimated GFR (MDRD)  


 


Glucose  


 


Calcium  


 


Total Bilirubin  


 


AST  


 


ALT  


 


Alkaline Phosphatase  


 


Troponin I High Sens   35.0 H*


 


Total Protein  


 


Albumin  


 


Globulin  


 


Albumin/Globulin Ratio  


 


Lipase  


 


Urine Color  YELLOW 


 


Urine Clarity  CLEAR 


 


Urine pH  6.0 


 


Ur Specific Gravity  1.020 


 


Urine Protein  NEGATIVE 


 


Urine Glucose (UA)  NEGATIVE 


 


Urine Ketones  TRACE 


 


Urine Occult Blood  NEGATIVE 


 


Urine Nitrite  NEGATIVE 


 


Urine Bilirubin  NEGATIVE 


 


Urine Urobilinogen  0.2 (NORMAL) 


 


Ur Leukocyte Esterase  SMALL H 


 


Urine RBC  0-5 


 


Urine WBC  11-25 H 


 


Ur Squamous Epith Cells  FEW Squamous 


 


Urine Bacteria  Few 


 


Urine Mucus  Few Strands 


 


Ur Microscopic Review  INDICATED 


 


Urine Culture Comments  INDICATED 














- Rads (name of study)


  ** cxr


Radiology: Prelim report reviewed, EMP read contemporaneously, See rad report 

(No acute abnormality)





PD MEDICAL DECISION MAKING





- ED course


Complexity details: reviewed results, re-evaluated patient, considered 

differential (No ST elevation MI, no aortic dissection, no PE, no tension 

pneumothorax, no aortic aneurysm), d/w patient, d/w consultant


ED course: 





88-year-old female with chest pain earlier today.  Mild elevation in her high-

sensitivity troponin.  Redraw was performed 2 hours after and it did increase 

slightly.  I think it is reasonable to place her in observation for the night 

and to retest her troponin in the morning to make sure there is no spike.  

Patient is currently asymptomatic in the emergency department.  Discussed the 

case with Dr. Barker, hospitalist who accepts





This document was made in part using voice recognition software. While efforts 

are made to proofread this document, sound alike and grammatical errors may 

occur.





Departure





- Departure


Disposition: ED Place in Observation


Clinical Impression: 


Chest pain


Qualifiers:


 Chest pain type: unspecified Qualified Code(s): R07.9 - Chest pain, unspecified





Condition: Stable


Discharge Date/Time: 06/10/21 22:15

## 2021-06-10 NOTE — XRAY REPORT
PROCEDURE:  Chest 1 View X-Ray

 

INDICATIONS:  Chest Pain

 

TECHNIQUE:  One view of the chest was acquired.  

 

COMPARISON:  January 12, 2021

 

FINDINGS:  

 

Surgical changes and devices:Dual-chamber left-sided pacemaker in good position..  

 

Lungs and pleura:  No pleural effusions or pneumothorax.  Lungs are clear.  

 

Mediastinum:  Mediastinal contours appear normal.  Heart size is normal.  Atherosclerotic vascular ca
lcification noted in the aortic arch. 

 

Bones and chest wall:  No suspicious bony lesions.  Overlying soft tissues appear unremarkable.  

 

IMPRESSION:  

No acute cardiopulmonary findings

 

Reviewed by: Yan Castaneda MD on 6/10/2021 6:08 PM CHET

Approved by: Yan Castaneda MD on 6/10/2021 6:08 PM [place]

 

 

Station ID:  [id number]

## 2021-06-11 LAB
ANION GAP SERPL CALCULATED.4IONS-SCNC: 9 MMOL/L (ref 6–13)
BUN SERPL-MCNC: 26 MG/DL (ref 6–20)
CALCIUM UR-MCNC: 10.1 MG/DL (ref 8.5–10.3)
CHLORIDE SERPL-SCNC: 99 MMOL/L (ref 101–111)
CO2 SERPL-SCNC: 24 MMOL/L (ref 21–32)
CREAT SERPLBLD-SCNC: 1 MG/DL (ref 0.4–1)
GFRSERPLBLD MDRD-ARVRAT: 52 ML/MIN/{1.73_M2} (ref 89–?)
GLUCOSE SERPL-MCNC: 97 MG/DL (ref 70–100)
POTASSIUM SERPL-SCNC: 3.8 MMOL/L (ref 3.5–5)
SODIUM SERPLBLD-SCNC: 132 MMOL/L (ref 135–145)

## 2021-06-11 RX ADMIN — SODIUM CHLORIDE, PRESERVATIVE FREE SCH ML: 5 INJECTION INTRAVENOUS at 16:56

## 2021-06-11 RX ADMIN — ASPIRIN SCH MG: 81 TABLET, COATED ORAL at 08:06

## 2021-06-11 NOTE — NUCLEAR MEDICINE REPORT
PROCEDURE:  

Rest and pharmacological stress myocardial perfusion SPECT with gated imaging and ejection fraction

 

INDICATIONS:  chest pain

 

RADIOPHARMACEUTICAL:  15.7 mCi Tc-99m Myoview IV at rest and 44.3 mCi Tc-99m Myoview IV at peak exerc
ise.  One-day-protocol was performed.  

 

TECHNIQUE:  Radiopharmaceutical was injected at peak stress test, and also at rest.  SPECT images wer
e obtained.  SPECT myocardial perfusion images were displayed in short axis, horizontal long axis, an
d vertical long axis views.  Gated images were reviewed using Auto[employer] software.  

 

COMPARISON:   None available.

 

FINDINGS:  

 

Raw data:  There is good myocardial labeling by radiotracer.  No significant motion artifacts.  Lung-
to-heart ratio is 0.28 (normal is less than 0.46 for tetrafosmin tracer).  

 

Left ventricle function:  Gated images demonstrate normal left ventricle wall thickening. There is se
ptal rocking mild diffuse hypokinesia. No transient ischemic dilation; TID is 0.97 (normal less than 
1.30).  The left ventricle resting end-diastolic volume is normal.  Left ventricle stress ejection fr
action is 47%; normal values are above 45%.  

 

Myocardial perfusion:  There is a large, severe, fixed perfusion defect in the septum consistent with
 myocardial infarct. There is minimal reversibility in the infarcted territory, compatible with minim
al sherley-infarct myocardial ischemia.

 

IMPRESSION:  

1. Abnormal myocardial perfusion images. There is a large, severe, fixed perfusion defect in the sept
um consistent with myocardial infarct. 

2. There is minimal reversibility in the infarcted territory, compatible with minimal sherley-infarct is
chemia.

3. Septal rocking and mild diffuse hypokinesia of the left ventricle. Left ventricular ejection fract
ion is at the lower range of normal.

4. Please correlate with stress EKG report.

 

The result was discussed with Dr. Lazo.

 

PQRS ATTESTATIONS:  

Measure 322 - Is this imaging test primarily performed on a low-risk surgery patient for preoperative
 evaluation within 30 days preceding their low-risk non-cardiac surgery?  Low-risk surgery is defined
 as cardiac death or myocardial infarction less than 1%, including (but not limited to) endoscopic pr
ocedures, superficial procedures, cataract surgery, and excisional breast surgery:  Answer:  No

Measure 323 - Is this imaging test performed primarily for the monitoring of an asymptomatic patient 
who had percutaneous coronary intervention on the visit date or within 2 years of the visit date?  An
swer:  No

Measure 324 - Is this imaging test performed primarily for the initial detection and risk assessment 
on an asymptomatic, low coronary heart disease patient?  Low CHD risk definition = clinicians should 
consider the maximum number of available patient factors used to estimate risk based on [place] (A
TP III criteria), typically age, gender, diabetes, smoking status, and use of blood pressure medicati
on, and integrate age appropriate estimates for missing elements, such as LDL or standard blood press
ure.  Answer:  No

 

Reviewed by: Fer Leonardo MD on 6/11/2021 5:34 PM PDT

Approved by: Fer Leonardo MD on 6/11/2021 5:34 PM PDT

 

 

Station ID:  [id number]

## 2021-06-11 NOTE — CARDIAC PROCEDURE NOTE
Stress Test Report


Service Date: 06/11/21


Ordering Provider: Jessica Estrada NP (PCP), Dr Alf Gutierrez (Cardiologist, [facility]

)


Indication for Test: 





Chest pain





Type of Stress Test: Pharmacologic Stress Test with MPI


Pharmacologic Agent: Lexiscan


Procedure: 





After signing informed consent, the patient underwent a Lexiscan pharmaceutical 

stress test with nuclear myocardial perfusion imaging.





Resting heart rate: 65.                Peak heart rate: 106.





Resting blood pressure: 184/94.    Peak blood pressure: 159/80.





Lexiscan was infused per protocol.  The patient had brief abdominal pain, nausea

and headache.  Aminophylline 25 mg IV was given for reversal of sx.  The patient

had no chest pain, arm pain or shortness of breath.  Oxygen saturation was 97 to

99% on room air throughout the test.








Resting EKG: Atrial paced rhythm, ventricular paced intermittently alternating 

with underlying, native left bundle branch block.





EKG at peak: Sinus tachycardia, left bundle branch block (therefore cannot 

comment on ST-segments or T-waves).





Summary: 





1) Atrial paced and intermittent ventricular paced rhythm and native left bundle

branch block is present on resting EKG.


2) No chest pain developed with Lexiscan infusion.


3) EKG changes cannot be evaluated when left bundle branch block or pacing is 

present.


4) Nuclear images were reported separately and showed: a large, severe, fixed 

perfusion defect of the septum, consistent with myocardial infarction and 

minimal reversibility in the infarct area, consistent with minimal sherley-infarct 

ischemia. Septal rocking, the stress LVEF is 47%.





IMPRESSION:





1) Intermittent ventricular paced rhythm and native left bundle branch block is 

present on EKG and cannot comment on ST-segments or T-wave changes therefore.


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  Depressed LVEF.


3) This patient's cardiac risk: High.

## 2021-06-11 NOTE — CT REPORT
PROCEDURE:  ANGIO CHEST W/WO

 

INDICATIONS:  Chest pain. Elevated d-dimer. History of PE.

 

CONTRAST:  IV CONTRAST: Optiray 320 ml: 80 PO CONTRAST: *NO PO CONTRAST 

 

TECHNIQUE:  

After the administration of intravenous contrast, 2 mm thick sections acquired from the pulmonary api
kalpesh to the posterior costophrenic angles.  3-dimensional maximum intensity projection (MIP) coronal a
nd sagittal reformats were then acquired through the thorax. For radiation dose reduction, the follow
ing was used:  automated exposure control, adjustment of mA and/or kV according to patient size. 

 

COMPARISON:  None

 

FINDINGS:  

Image quality:  Excellent.  

 

Pulmonary arteries:  Pulmonary arteries are normal in size, and demonstrate no intraluminal filling d
efects to suggest central pulmonary embolism.  

 

Lungs and pleura: Emphysematous changes noted in the pulmonary parenchyma is coarse interstitial ambriz
ge present as well. Minimal right basilar dependent atelectasis. No focal infiltrate, pneumothorax or
 pleural effusion.

 

Mediastinum: Heart size is enlarged. No mediastinal or hilar adenopathy.  Thoracic aorta is normal in
 caliber. Diffuse aortic atherosclerotic vascular calcification noted.  Esophagus is normal in calibe
r, without hiatal hernia.  Left-sided dual-chamber pacemaker present.

 

Bones and chest wall:  No suspicious bony lesions.  Ribs and thoracic spine appear intact throughout.
  No axillary or supraclavicular adenopathy.  Thyroid is unremarkable.

 

Abdomen:  Visualized upper abdominal solid organs appear normal in the early arterial phase of enhanc
ement.  Partially visualized cholelithiasis is a low-density lesion in the upper pole left kidney gabbi
suring 3.6 cm. Large simple cyst noted in the right hepatic lobe measures 7 cm it. Additionally, ther
e is a 1.4 cm low-density lesion with peripheral calcification.

 

IMPRESSION:  

 

1. No evidence of pulmonary embolism or aortic aneurysm.

2. Cardiomegaly, pacemaker and pulmonary emphysema.

3.  Low-density lesion in the upper pole the left kidney is incompletely assessed and may reflect sol
id lesion. Consider either follow-up contrast CT abdomen pelvis or ultrasound for further evaluation.


4. Simple right hepatic cysts measuring 7 cm. Additional 1.4 cm low-density lesion in the left hepati
c lobe with coarse calcification may reflect hemangioma or prior granulomatous disease. Consider furt
her evaluation with contrast CT of ultrasound.

5. Incidental cholelithiasis partially imaged.

 

 

Reviewed by: Yan Castaneda MD on 6/11/2021 6:08 PM AKDT

Approved by: Yan Castaneda MD on 6/11/2021 6:08 PM CHET

 

 

Station ID:  [id number]

## 2021-06-11 NOTE — PROVIDER PROGRESS NOTE
Subjective





- Prog Note Date


Prog Note Date: 06/11/21





- Subjective


Subjective: 


She currently reports no chest pain.  She does complain of left calf pain.  

Denies any difficulty breathing.





Current Medications





- Current Medications


Current Medications: 





Active Medications





Acetaminophen (Acetaminophen 325 Mg Tablet)  650 mg PO Q4HR PRN


   PRN Reason: Pain 1 to 4


   Last Admin: 06/11/21 14:47 Dose:  650 mg


   Documented by: 


Aspirin (Aspirin Ec 81 Mg Tablet)  81 mg PO DAILY [facility]


   Last Admin: 06/11/21 08:06 Dose:  81 mg


   Documented by: 


Ondansetron HCl (Ondansetron Odt 4 Mg Tablet)  4 mg TL Q6HR PRN


   PRN Reason: Nausea / Vomiting


   Last Admin: 06/10/21 23:49 Dose:  4 mg


   Documented by: 


Ondansetron HCl (Ondansetron 4 Mg/2 Ml Vial)  4 mg IVP Q6HR PRN


   PRN Reason: Nausea / Vomiting


Oxycodone HCl (Oxycodone 5 Mg Tablet)  5 mg PO Q4HR PRN


   PRN Reason: Pain 5 to 7


Sodium Chloride (Sodium Chloride Flush 0.9% 10 Ml Syringe)  10 ml IVP PRN PRN


   PRN Reason: AS NEEDED PER PROVIDER ORDERS


Sodium Chloride (Sodium Chloride Flush 0.9% 10 Ml Syringe)  10 ml IVP 

0100,0900,1700 [facility]


   Last Admin: 06/11/21 16:56 Dose:  10 ml


   Documented by: 





                                        





Spironolactone 25 mg PO BID 10/12/14 


traMADol [Ultram] 50 mg PO HS 10/12/14 


Aspirin [Grainger Aspirin] 81 mg PO DAILY 06/10/21 


Magnesium Oxide [Magnesium] 400 mg PO DAILY 06/10/21 


Nitrofurantoin Macrocrystal [Macrodantin] 50 mg PO HS 06/10/21 


Oxybutynin Chloride [Ditropan Xl] 5 mg PO DAILY 06/10/21 


dilTIAZem HCL [Diltiazem 24Hr ER (Xr)] 120 mg PO DAILY 06/10/21 


Dorzolamide/Timolol Ophth Soln [Cosopt] 1 drops EACHEYE BID 06/11/21 


Latanoprost 0.005% Ophth Drops [Xalatan Ophth Drops] 1 drops EACHEYE QPM 

06/11/21 











Objective





- Vital Signs/Intake & Output


Reviewed Vital Signs: Yes


Vital Signs: 


                                Vital Signs x48h











  Temp Pulse Resp BP BP Pulse Ox


 


 06/11/21 16:34   60   157/61 H  


 


 06/11/21 16:17  36.3 C L  61  18   168/56 H  95


 


 06/11/21 13:00  36.3 C L  60  18   141/64 H  100











Intake & Output: 


                                 Intake & Output











 06/08/21 06/09/21 06/10/21 06/11/21





 23:59 23:59 23:59 23:59


 


Intake Total    100


 


Output Total   0 200


 


Balance   0 -100














- Objective


General Appearance: positive: No acute distress, Alert


Eyes Bilateral: positive: Normal inspection, Conjunctivae nml


ENT: positive: ENT inspection nml


Neck: positive: Nml inspection


Respiratory: positive: No respiratory distress.  negative: Wheezes, Rales


Cardiovascular: positive: Regular rate & rhythm, No murmur.  negative: 

Tachycardia, Systolic murmur


Abdomen: positive: Non-tender, No distention.  negative: Tenderness


Skin: positive: Warm, Dry


Extremities: positive: Full ROM, No pedal edema, Calf tenderness.  negative: 

Lily's sign/cords


Neurologic/Psychiatric: negative: Disoriented to person, Disoriented to place





- Lab Results


Fish Bones: 


                                 06/10/21 18:56





                                 06/11/21 02:00


Other Labs: 


                               Lab Results x24hrs











  06/11/21 06/11/21 06/11/21 Range/Units





  07:49 02:00 02:00 


 


WBC     (4.8-10.8)  x10^3/uL


 


RBC     (4.20-5.40)  10^6/uL


 


Hgb     (12.0-16.0)  g/dL


 


Hct     (37.0-47.0)  %


 


MCV     (81.0-99.0)  fL


 


MCH     (27.0-31.0)  pg


 


MCHC     (32.0-36.0)  g/dL


 


RDW     (12.0-15.0)  %


 


Plt Count     (130-450)  10^3/uL


 


MPV     (7.9-10.8)  fL


 


Neut # (Auto)     (1.5-6.6)  10^3/uL


 


Lymph # (Auto)     (1.5-3.5)  10^3/uL


 


Mono # (Auto)     (0.0-1.0)  10^3/uL


 


Eos # (Auto)     (0.0-0.7)  10^3/uL


 


Baso # (Auto)     (0.0-0.1)  10^3/uL


 


Absolute Nucleated RBC     x10^3/uL


 


Nucleated RBC %     /100WBC


 


D-Dimer    285.6 H  (200.0-255.0)  ng/mL


 


Sodium   132 L   (135-145)  mmol/L


 


Potassium   3.8   (3.5-5.0)  mmol/L


 


Chloride   99 L   (101-111)  mmol/L


 


Carbon Dioxide   24   (21-32)  mmol/L


 


Anion Gap   9.0   (6-13)  


 


BUN   26 H   (6-20)  mg/dL


 


Creatinine   1.0   (0.4-1.0)  mg/dL


 


Estimated GFR (MDRD)   52 L   (>89)  


 


Glucose   97   ()  mg/dL


 


Calcium   10.1   (8.5-10.3)  mg/dL


 


Total Bilirubin     (0.2-1.0)  mg/dL


 


AST     (10-42)  IU/L


 


ALT     (10-60)  IU/L


 


Alkaline Phosphatase     ()  IU/L


 


Troponin I High Sens  93.9 H*    (2.3-14.8)  ng/L


 


Total Protein     (6.7-8.2)  g/dL


 


Albumin     (3.2-5.5)  g/dL


 


Globulin     (2.1-4.2)  g/dL


 


Albumin/Globulin Ratio     (1.0-2.2)  


 


Lipase     (22-51)  U/L


 


Urine Color     


 


Urine Clarity     (CLEAR)  


 


Urine pH     (5.0-7.5)  PH


 


Ur Specific Gravity     (1.002-1.030)  


 


Urine Protein     (NEGATIVE)  mg/dL


 


Urine Glucose (UA)     (NEGATIVE)  mg/dL


 


Urine Ketones     (NEGATIVE)  mg/dL


 


Urine Occult Blood     (NEGATIVE)  


 


Urine Nitrite     (NEGATIVE)  


 


Urine Bilirubin     (NEGATIVE)  


 


Urine Urobilinogen     (NORMAL)  E.U./dL


 


Ur Leukocyte Esterase     (NEGATIVE)  


 


Urine RBC     (0-5)  /HPF


 


Urine WBC     (0-5)  /HPF


 


Ur Squamous Epith Cells     (<= Few)  


 


Urine Bacteria     (None Seen)  /HPF


 


Urine Mucus     


 


Ur Microscopic Review     


 


Urine Culture Comments     


 


Nasal Adenovirus (PCR)     


 


Nasal B. parapertussis DNA (PCR)     


 


Nasal Coronavir 229E PCR     


 


Nasal Coronavir HKU1 PCR     


 


Nasal Coronavir NL63 PCR     


 


Nasal Coronavir OC43 PCR     


 


Nasal Enterovir/Rhinovir PCR     


 


Nasal Influenza B PCR     


 


Nasal Influenza A PCR     


 


Nasal Parainfluen 1 PCR     


 


Nasal Parainfluen 2 PCR     


 


Nasal Parainfluen 3 PCR     


 


Nasal Parainfluen 4 PCR     


 


Nasal RSV (PCR)     


 


Nasal B.pertussis DNA PCR     


 


Nasal C.pneumoniae (PCR)     


 


Naeem Human Metapneumo PCR     


 


Nasal M.pneumoniae (PCR)     


 


Nasal SARS-CoV-2 (PCR)     














  06/11/21 06/10/21 06/10/21 Range/Units





  02:00 22:05 20:49 


 


WBC     (4.8-10.8)  x10^3/uL


 


RBC     (4.20-5.40)  10^6/uL


 


Hgb     (12.0-16.0)  g/dL


 


Hct     (37.0-47.0)  %


 


MCV     (81.0-99.0)  fL


 


MCH     (27.0-31.0)  pg


 


MCHC     (32.0-36.0)  g/dL


 


RDW     (12.0-15.0)  %


 


Plt Count     (130-450)  10^3/uL


 


MPV     (7.9-10.8)  fL


 


Neut # (Auto)     (1.5-6.6)  10^3/uL


 


Lymph # (Auto)     (1.5-3.5)  10^3/uL


 


Mono # (Auto)     (0.0-1.0)  10^3/uL


 


Eos # (Auto)     (0.0-0.7)  10^3/uL


 


Baso # (Auto)     (0.0-0.1)  10^3/uL


 


Absolute Nucleated RBC     x10^3/uL


 


Nucleated RBC %     /100WBC


 


D-Dimer     (200.0-255.0)  ng/mL


 


Sodium     (135-145)  mmol/L


 


Potassium     (3.5-5.0)  mmol/L


 


Chloride     (101-111)  mmol/L


 


Carbon Dioxide     (21-32)  mmol/L


 


Anion Gap     (6-13)  


 


BUN     (6-20)  mg/dL


 


Creatinine     (0.4-1.0)  mg/dL


 


Estimated GFR (MDRD)     (>89)  


 


Glucose     ()  mg/dL


 


Calcium     (8.5-10.3)  mg/dL


 


Total Bilirubin     (0.2-1.0)  mg/dL


 


AST     (10-42)  IU/L


 


ALT     (10-60)  IU/L


 


Alkaline Phosphatase     ()  IU/L


 


Troponin I High Sens  113.9 H*   35.0 H*  (2.3-14.8)  ng/L


 


Total Protein     (6.7-8.2)  g/dL


 


Albumin     (3.2-5.5)  g/dL


 


Globulin     (2.1-4.2)  g/dL


 


Albumin/Globulin Ratio     (1.0-2.2)  


 


Lipase     (22-51)  U/L


 


Urine Color     


 


Urine Clarity     (CLEAR)  


 


Urine pH     (5.0-7.5)  PH


 


Ur Specific Gravity     (1.002-1.030)  


 


Urine Protein     (NEGATIVE)  mg/dL


 


Urine Glucose (UA)     (NEGATIVE)  mg/dL


 


Urine Ketones     (NEGATIVE)  mg/dL


 


Urine Occult Blood     (NEGATIVE)  


 


Urine Nitrite     (NEGATIVE)  


 


Urine Bilirubin     (NEGATIVE)  


 


Urine Urobilinogen     (NORMAL)  E.U./dL


 


Ur Leukocyte Esterase     (NEGATIVE)  


 


Urine RBC     (0-5)  /HPF


 


Urine WBC     (0-5)  /HPF


 


Ur Squamous Epith Cells     (<= Few)  


 


Urine Bacteria     (None Seen)  /HPF


 


Urine Mucus     


 


Ur Microscopic Review     


 


Urine Culture Comments     


 


Nasal Adenovirus (PCR)   NOT DETECTED   


 


Nasal B. parapertussis DNA (PCR)   NOT DETECTED   


 


Nasal Coronavir 229E PCR   NOT DETECTED   


 


Nasal Coronavir HKU1 PCR   NOT DETECTED   


 


Nasal Coronavir NL63 PCR   NOT DETECTED   


 


Nasal Coronavir OC43 PCR   NOT DETECTED   


 


Nasal Enterovir/Rhinovir PCR   NOT DETECTED   


 


Nasal Influenza B PCR   NOT DETECTED   


 


Nasal Influenza A PCR   NOT DETECTED   


 


Nasal Parainfluen 1 PCR   NOT DETECTED   


 


Nasal Parainfluen 2 PCR   NOT DETECTED   


 


Nasal Parainfluen 3 PCR   NOT DETECTED   


 


Nasal Parainfluen 4 PCR   NOT DETECTED   


 


Nasal RSV (PCR)   NOT DETECTED   


 


Nasal B.pertussis DNA PCR   NOT DETECTED   


 


Nasal C.pneumoniae (PCR)   NOT DETECTED   


 


Naeem Human Metapneumo PCR   NOT DETECTED   


 


Nasal M.pneumoniae (PCR)   NOT DETECTED   


 


Nasal SARS-CoV-2 (PCR)   NOT DETECTED   














  06/10/21 06/10/21 06/10/21 Range/Units





  20:06 18:56 18:56 


 


WBC     (4.8-10.8)  x10^3/uL


 


RBC     (4.20-5.40)  10^6/uL


 


Hgb     (12.0-16.0)  g/dL


 


Hct     (37.0-47.0)  %


 


MCV     (81.0-99.0)  fL


 


MCH     (27.0-31.0)  pg


 


MCHC     (32.0-36.0)  g/dL


 


RDW     (12.0-15.0)  %


 


Plt Count     (130-450)  10^3/uL


 


MPV     (7.9-10.8)  fL


 


Neut # (Auto)     (1.5-6.6)  10^3/uL


 


Lymph # (Auto)     (1.5-3.5)  10^3/uL


 


Mono # (Auto)     (0.0-1.0)  10^3/uL


 


Eos # (Auto)     (0.0-0.7)  10^3/uL


 


Baso # (Auto)     (0.0-0.1)  10^3/uL


 


Absolute Nucleated RBC     x10^3/uL


 


Nucleated RBC %     /100WBC


 


D-Dimer     (200.0-255.0)  ng/mL


 


Sodium    131 L  (135-145)  mmol/L


 


Potassium    4.2  (3.5-5.0)  mmol/L


 


Chloride    98 L  (101-111)  mmol/L


 


Carbon Dioxide    23  (21-32)  mmol/L


 


Anion Gap    10.0  (6-13)  


 


BUN    31 H  (6-20)  mg/dL


 


Creatinine    1.2 H  (0.4-1.0)  mg/dL


 


Estimated GFR (MDRD)    42 L  (>89)  


 


Glucose    110 H  ()  mg/dL


 


Calcium    9.7  (8.5-10.3)  mg/dL


 


Total Bilirubin    0.7  (0.2-1.0)  mg/dL


 


AST    23  (10-42)  IU/L


 


ALT    15  (10-60)  IU/L


 


Alkaline Phosphatase    49  ()  IU/L


 


Troponin I High Sens   28.7 H*   (2.3-14.8)  ng/L


 


Total Protein    6.9  (6.7-8.2)  g/dL


 


Albumin    4.2  (3.2-5.5)  g/dL


 


Globulin    2.7  (2.1-4.2)  g/dL


 


Albumin/Globulin Ratio    1.6  (1.0-2.2)  


 


Lipase    46  (22-51)  U/L


 


Urine Color  YELLOW    


 


Urine Clarity  CLEAR    (CLEAR)  


 


Urine pH  6.0    (5.0-7.5)  PH


 


Ur Specific Gravity  1.020    (1.002-1.030)  


 


Urine Protein  NEGATIVE    (NEGATIVE)  mg/dL


 


Urine Glucose (UA)  NEGATIVE    (NEGATIVE)  mg/dL


 


Urine Ketones  TRACE    (NEGATIVE)  mg/dL


 


Urine Occult Blood  NEGATIVE    (NEGATIVE)  


 


Urine Nitrite  NEGATIVE    (NEGATIVE)  


 


Urine Bilirubin  NEGATIVE    (NEGATIVE)  


 


Urine Urobilinogen  0.2 (NORMAL)    (NORMAL)  E.U./dL


 


Ur Leukocyte Esterase  SMALL H    (NEGATIVE)  


 


Urine RBC  0-5    (0-5)  /HPF


 


Urine WBC  11-25 H    (0-5)  /HPF


 


Ur Squamous Epith Cells  FEW Squamous    (<= Few)  


 


Urine Bacteria  Few    (None Seen)  /HPF


 


Urine Mucus  Few Strands    


 


Ur Microscopic Review  INDICATED    


 


Urine Culture Comments  INDICATED    


 


Nasal Adenovirus (PCR)     


 


Nasal B. parapertussis DNA (PCR)     


 


Nasal Coronavir 229E PCR     


 


Nasal Coronavir HKU1 PCR     


 


Nasal Coronavir NL63 PCR     


 


Nasal Coronavir OC43 PCR     


 


Nasal Enterovir/Rhinovir PCR     


 


Nasal Influenza B PCR     


 


Nasal Influenza A PCR     


 


Nasal Parainfluen 1 PCR     


 


Nasal Parainfluen 2 PCR     


 


Nasal Parainfluen 3 PCR     


 


Nasal Parainfluen 4 PCR     


 


Nasal RSV (PCR)     


 


Nasal B.pertussis DNA PCR     


 


Nasal C.pneumoniae (PCR)     


 


Naeem Human Metapneumo PCR     


 


Nasal M.pneumoniae (PCR)     


 


Nasal SARS-CoV-2 (PCR)     














  06/10/21 Range/Units





  18:56 


 


WBC  5.8  (4.8-10.8)  x10^3/uL


 


RBC  4.29  (4.20-5.40)  10^6/uL


 


Hgb  11.2 L  (12.0-16.0)  g/dL


 


Hct  34.1 L  (37.0-47.0)  %


 


MCV  79.5 L  (81.0-99.0)  fL


 


MCH  26.1 L  (27.0-31.0)  pg


 


MCHC  32.8  (32.0-36.0)  g/dL


 


RDW  19.3 H  (12.0-15.0)  %


 


Plt Count  231  (130-450)  10^3/uL


 


MPV  9.0  (7.9-10.8)  fL


 


Neut # (Auto)  3.3  (1.5-6.6)  10^3/uL


 


Lymph # (Auto)  1.6  (1.5-3.5)  10^3/uL


 


Mono # (Auto)  0.7  (0.0-1.0)  10^3/uL


 


Eos # (Auto)  0.2  (0.0-0.7)  10^3/uL


 


Baso # (Auto)  0.0  (0.0-0.1)  10^3/uL


 


Absolute Nucleated RBC  0.00  x10^3/uL


 


Nucleated RBC %  0.0  /100WBC


 


D-Dimer   (200.0-255.0)  ng/mL


 


Sodium   (135-145)  mmol/L


 


Potassium   (3.5-5.0)  mmol/L


 


Chloride   (101-111)  mmol/L


 


Carbon Dioxide   (21-32)  mmol/L


 


Anion Gap   (6-13)  


 


BUN   (6-20)  mg/dL


 


Creatinine   (0.4-1.0)  mg/dL


 


Estimated GFR (MDRD)   (>89)  


 


Glucose   ()  mg/dL


 


Calcium   (8.5-10.3)  mg/dL


 


Total Bilirubin   (0.2-1.0)  mg/dL


 


AST   (10-42)  IU/L


 


ALT   (10-60)  IU/L


 


Alkaline Phosphatase   ()  IU/L


 


Troponin I High Sens   (2.3-14.8)  ng/L


 


Total Protein   (6.7-8.2)  g/dL


 


Albumin   (3.2-5.5)  g/dL


 


Globulin   (2.1-4.2)  g/dL


 


Albumin/Globulin Ratio   (1.0-2.2)  


 


Lipase   (22-51)  U/L


 


Urine Color   


 


Urine Clarity   (CLEAR)  


 


Urine pH   (5.0-7.5)  PH


 


Ur Specific Gravity   (1.002-1.030)  


 


Urine Protein   (NEGATIVE)  mg/dL


 


Urine Glucose (UA)   (NEGATIVE)  mg/dL


 


Urine Ketones   (NEGATIVE)  mg/dL


 


Urine Occult Blood   (NEGATIVE)  


 


Urine Nitrite   (NEGATIVE)  


 


Urine Bilirubin   (NEGATIVE)  


 


Urine Urobilinogen   (NORMAL)  E.U./dL


 


Ur Leukocyte Esterase   (NEGATIVE)  


 


Urine RBC   (0-5)  /HPF


 


Urine WBC   (0-5)  /HPF


 


Ur Squamous Epith Cells   (<= Few)  


 


Urine Bacteria   (None Seen)  /HPF


 


Urine Mucus   


 


Ur Microscopic Review   


 


Urine Culture Comments   


 


Nasal Adenovirus (PCR)   


 


Nasal B. parapertussis DNA (PCR)   


 


Nasal Coronavir 229E PCR   


 


Nasal Coronavir HKU1 PCR   


 


Nasal Coronavir NL63 PCR   


 


Nasal Coronavir OC43 PCR   


 


Nasal Enterovir/Rhinovir PCR   


 


Nasal Influenza B PCR   


 


Nasal Influenza A PCR   


 


Nasal Parainfluen 1 PCR   


 


Nasal Parainfluen 2 PCR   


 


Nasal Parainfluen 3 PCR   


 


Nasal Parainfluen 4 PCR   


 


Nasal RSV (PCR)   


 


Nasal B.pertussis DNA PCR   


 


Nasal C.pneumoniae (PCR)   


 


Naeem Human Metapneumo PCR   


 


Nasal M.pneumoniae (PCR)   


 


Nasal SARS-CoV-2 (PCR)   














ABX Reporting


Has patient been on IV antibiotics over the past 48 hours?: No





Assessment/Plan





- Problem List


(1) Chest pain


Impression: 


This does not appear to be ischemic in nature.  Her troponin did increase 

overnight but has began to decrease.  Her stress test did reveal an old 

perfusion defect but no evidence of reversible ischemia.  I did speak with her 

cardiologist who felt that at this point in time, there is no need for transfer 

for intervention and that she should follow-up with him.  Given her D-dimer is 

elevated, we will pursue a CT angiogram especially given the fact that she also 

had left calf pain this morning and that she has a history of pulmonary 

embolism.


Qualifiers: 


   Chest pain type: unspecified   Qualified Code(s): R07.9 - Chest pain, 

unspecified   





(2) CLIFFORD (acute kidney injury)


Impression: 


She did have acute kidney injury on admission with a creatinine of 1.2.  Her 

baseline is 0.7.  This has improved to 1.0 with IV fluids.  We will hold her 

spironolactone and likely resume this tomorrow morning as her renal function 

returns to baseline.








(3) History of pulmonary embolism


Impression: 


Given her chest pain and the fact that her stress test did not reveal ischemia, 

we will pursue a CT angiogram of the chest this evening especially given that 

her D-dimer is elevated.








(4) HTN (hypertension), benign


Impression: 


She is hypertensive with systolic in the 150s.  We will resume her diltiazem.








(5) Hyponatremia


Impression: 


This is chronic and stable.  This has slightly improved with IV fluids.  Today 

it is 132.  We will recheck her sodium in the morning.








(6) Dementia


Impression: 


Stable and at baseline.  She will be discharged home to her son who is living 

with her.


Qualifiers: 


   Dementia type: Alzheimer's   Alzheimer's disease onset: late-onset

## 2021-06-12 VITALS — SYSTOLIC BLOOD PRESSURE: 118 MMHG | DIASTOLIC BLOOD PRESSURE: 81 MMHG

## 2021-06-12 LAB
ANION GAP SERPL CALCULATED.4IONS-SCNC: 10 MMOL/L (ref 6–13)
BASOPHILS NFR BLD AUTO: 0 10^3/UL (ref 0–0.1)
BASOPHILS NFR BLD AUTO: 0.5 %
BUN SERPL-MCNC: 23 MG/DL (ref 6–20)
CALCIUM UR-MCNC: 10.1 MG/DL (ref 8.5–10.3)
CHLORIDE SERPL-SCNC: 98 MMOL/L (ref 101–111)
CO2 SERPL-SCNC: 25 MMOL/L (ref 21–32)
CREAT SERPLBLD-SCNC: 0.9 MG/DL (ref 0.4–1)
EOSINOPHIL # BLD AUTO: 0.1 10^3/UL (ref 0–0.7)
EOSINOPHIL NFR BLD AUTO: 1.6 %
ERYTHROCYTE [DISTWIDTH] IN BLOOD BY AUTOMATED COUNT: 19.5 % (ref 12–15)
GFRSERPLBLD MDRD-ARVRAT: 59 ML/MIN/{1.73_M2} (ref 89–?)
GLUCOSE SERPL-MCNC: 113 MG/DL (ref 70–100)
HCT VFR BLD AUTO: 38.1 % (ref 37–47)
HGB UR QL STRIP: 12.1 G/DL (ref 12–16)
LYMPHOCYTES # SPEC AUTO: 1.3 10^3/UL (ref 1.5–3.5)
LYMPHOCYTES NFR BLD AUTO: 23 %
MCH RBC QN AUTO: 25.7 PG (ref 27–31)
MCHC RBC AUTO-ENTMCNC: 31.8 G/DL (ref 32–36)
MCV RBC AUTO: 80.9 FL (ref 81–99)
MONOCYTES # BLD AUTO: 0.6 10^3/UL (ref 0–1)
MONOCYTES NFR BLD AUTO: 9.7 %
NEUTROPHILS # BLD AUTO: 3.8 10^3/UL (ref 1.5–6.6)
NEUTROPHILS # SNV AUTO: 5.8 X10^3/UL (ref 4.8–10.8)
NEUTROPHILS NFR BLD AUTO: 65 %
NRBC # BLD AUTO: 0 /100WBC
NRBC # BLD AUTO: 0 X10^3/UL
PDW BLD AUTO: 9.1 FL (ref 7.9–10.8)
PLATELET # BLD: 250 10^3/UL (ref 130–450)
POTASSIUM SERPL-SCNC: 3.9 MMOL/L (ref 3.5–5)
RBC MAR: 4.71 10^6/UL (ref 4.2–5.4)
SODIUM SERPLBLD-SCNC: 133 MMOL/L (ref 135–145)

## 2021-06-12 RX ADMIN — ASPIRIN SCH MG: 81 TABLET, COATED ORAL at 08:41

## 2021-06-12 RX ADMIN — SODIUM CHLORIDE, PRESERVATIVE FREE SCH ML: 5 INJECTION INTRAVENOUS at 08:42

## 2021-06-12 RX ADMIN — SODIUM CHLORIDE, PRESERVATIVE FREE SCH ML: 5 INJECTION INTRAVENOUS at 00:21

## 2021-06-12 NOTE — ULTRASOUND REPORT
PROCEDURE:  Retroperitoneal

 

INDICATIONS:  Left kidney lesion.

 

TECHNIQUE:  

Real-time scanning was performed of the retroperitoneal organs, with image documentation.  

 

COMPARISON:  Correlation is made with the recent prior CT, 6/11/2021. Correlation is also made with t
he accompanying limited abdomen abdominal ultrasound, 6/12/2021.

 

FINDINGS: 

 

Kidneys:  Kidneys are normal in size.  Right kidney measures 10.6 cm long; left kidney measures 12.6 
cm long.  Right renal cortical thickness is 1.4 cm; left renal cortical thickness is 1.3 cm.  No joon
d masses or hydronephrosis. 

 

Within the left kidney superiorly, there is a 4 mm hyperechoic focus seen. 

 

Simple appearing bilateral renal cysts are seen, with the largest on the right seen within the mid ki
dney measuring up to 1 cm. The largest on the left is seen inferiorly and medially measuring up to 5 
cm.

 

The prevoid bladder volume is 71 cc. The post void bladder volume is 4 cc. Both  ureteral jets can be
 seen.   

 

 

 

IMPRESSION:  

 

Multiple simple appearing bilateral renal cysts are seen. The largest is seen on the left inferiorly 
measuring 5 cm.

 

Within the superior pole of the left kidney, there is a 4 mm hyperechoic focus, which is likely relat
ed to a nonobstructing stone. Differential diagnosis includes a benign fat-containing lesion, however
.

 

Reviewed by: Abdirizak Mora MD on 6/12/2021 9:37 AM CHET

Approved by: Abdirizak Mora MD on 6/12/2021 9:37 AM CHET

 

 

Station ID:  [id number]

## 2021-06-12 NOTE — DISCHARGE PLAN
Discharge Plan


Problem Reviewed?: Yes


Disposition: 01 Home, Self Care


Condition: Stable


Diet: Cardiac


Activity Restrictions: Activity as Tolerated


Health Concerns: 


You were seen in the hospital because of chest pain.  You had stress test 

performed and during this, you had no chest pain.  The imaging from the stress 

test showed that you may have had an old heart attack but there was currently no

evidence of lack of blood flow to the heart.  We also did do a CT scan of your 

chest to make sure there were no blood clots and this came back negative.


Plan of Treatment: 


There were no changes made to your medications.





The CT scan of your chest also showed a possible lesion in your liver which is a

cyst.  This was confirmed by ultrasound.





There was also a lesion in your kidney and we obtained an ultrasound of this 

prior to your discharge which confirmed this was a cyst.


Care Goals: 


Please return to the emergency department if you develop any chest pain, 

difficulty breathing.


Assessment: 


Patient and family expressed understanding of the treatment plan.


Additional Instructions or Follow Up instructions: 


Please follow-up with your primary care provider in 1 week and contact your 

cardiologist to schedule an appointment for follow-up.  They were aware that you

were here at the hospital.


No Smoking: If you smoke, Please STOP!  Call [phone] for help.

## 2021-06-12 NOTE — ULTRASOUND REPORT
PROCEDURE: Abdomen Limited

 

INDICATIONS:  Liver lesion on CT.

 

TECHNIQUE:  

Real-time focused scanning was performed of the abdomen, with image documentation.  

 

COMPARISON:  Correlation is made with the accompanying retroperitoneal ultrasound as well as the [facility]
t CT performed 6/11/2021.

 

FINDINGS:  The liver demonstrates normal  size. The liver demonstrates moderately  increased echogeni
city, which limits ultrasound sensitivity for detection of masses. Within the left liver superiorly a
nd posteriorly, there is a 9 mm simple cyst seen. A septated cyst can be seen on the right posteriorl
y and laterally measuring 6.8 x4.7 x 6 cm.

 

The patient is not NPO, with a partially collapsed gallbladder, which limits evaluation. Gallstones a
re seen within the gallbladder, measuring approximately 2 cm. The gallbladder wall is minimally thick
ened at 4 mm. There is no specific pericholecystic fluid. The sonographic Young's sign is negative. 
 

 

The common bile duct is minimally dilated at 8 mm.

 

 

 

IMPRESSION:  

 

Hepatic cysts are seen, including a septated cyst seen on the right posteriorly that measures up to 6
.8 cm.

 

Gallstones are seen. There is minimal gallbladder wall thickening seen, which is nonspecific in this 
patient with a partially collapsed gallbladder. No additional sonographic signs of cholecystitis are 
seen.

 

Minimal biliary dilatation is seen, measuring 8 mm.

 

Increased liver echogenicity is seen. This is nonspecific, yet it is most commonly attributed to fatt
y infiltration.

 

Reviewed by: Abdirizak Mora MD on 6/12/2021 9:34 AM CHET

Approved by: Abdirizak Mora MD on 6/12/2021 9:34 AM CHET

 

 

Station ID:  [id number]

## 2021-06-12 NOTE — DISCHARGE SUMMARY
Discharge Summary


Admit Date: 06/10/21


Discharge Date: 06/12/21


Discharging Provider: Jorge Covarrubias


Primary Care Provider: Valentine Petit


Code Status: Attempt Resuscitation


Condition at Discharge: Stable


Discharge Disposition: 01 Home, Self Care





- DIAGNOSES


Admission Diagnoses: 


Chest pain


History of pulmonary embolism


Dementia


Hypertension


Hyponatremia


CLIFFORD


UTI


Discharge Diagnoses with Status of Each Condition: 


Chest pain - resolved.


History of pulmonary embolism - stable.


Old myocardial infarction - stable.


Acute kidney injury - resolved.


Hyponatremia - improved.


Dementia - stable.


Hypertension - stable.


Recurrent UTI - stable.





- HPI


History of Present Illness: 


H&P per Dr. Barker:





She was taking a nap this afternoon when she was suddenly awoken from sleep with

sudden onset severe, severe central chest pain in the upper chest.  She thinks 

she was nauseated with this, and she thinks she had arm pain.  She is not quite 

sure.  This is where her forgetfulness contributes to some elements of a poor 

history.  The chest pain only lasted about 30 minutes.  By the time ambulance 

was called, it had gone away.  There is no diaphoresis, no palpitations.  No 

dizziness, headache.  She does have a history of a remote pulmonary emboli 

decades ago.  She does not remember what that felt like and does not know if 

this is the same symptom.  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 

gardens, walks his dog every day, tries to do light household chores.  She says 

there has been no change in her activity or endurance.  She denies any leg 

edema.  She is not on any hormone replacement therapy.





In the emergency room she has been hypertensive in the 170s to 180s systolic.  

She is got a paced rhythm on telemetry.  Oxygenating normally.  A delightful 

forgetful elderly female with a negative physical exam with regards to 

cardiopulmonary disease.  Her initial troponin was 28.7.  Second troponin was 

35.  She also has a new acute kidney insufficiency.  Usual creatinine is 0.7 and

she is 1.2.





She is now placed in observation for rule out MI.  I asked her that if a stress 

test was positive would she want intervention.  She promptly says yes.  If her 

cardiologist is willing to do a stent on her she would want that done.  However 

she would strongly reconsider bypass surgery.








- CONSULTS | PROCEDURES


Procedures: 


Lexiscan stress test showed atrial paced and intermittent ventricular paced 

rhythm in need of left bundle branch block is present on resting EKG.  No chest 

pain developed with Lexiscan infusion.  EKG changes cannot be evaluated on left 

bundle branch or pacing is present.





Myocardial perfusion imaging revealed abnormal myocardial perfusion images.  

There is a large, severe, fixed perfusion defect in the septum consistent with 

myocardial infarct.  There is minimal reversibility in the infarcted territory, 

compatible with minimal sherley-infarct ischemia.  Septal rocking and mild diffuse 

hypokinesis of left ventricle.  The left ventricular ejection fraction is in the

lower range of normal.





- HOSPITAL COURSE


Hospital Course: 


She was admitted for chest pain and was placed on telemetry and her troponins 

were trended.  They were initially in the low 30s but increased over 110 and the

n back down to the 90s.  This was discussed with her cardiologist who 

recommended obtaining a stress test and if this was suspicious for ischemia then

they would consider a cardiac catheterization.  She underwent a Lexiscan stress 

test and she had no chest pain during this.  Myocardial perfusion imaging 

revealed a fixed defect in the septum without any obvious reversible ischemia 

which was likely an old infarct.  This was discussed with her cardiologist once 

again and it was agreed that she can just follow-up on an outpatient basis and 

that there is no need for a cardiac catheterization at this time.  Given the 

patient did have the elevated troponin and history pulmonary embolism and 

complained of calf pain yesterday, we also obtained a CT angiogram of the chest 

to look for pulmonary embolism.  This was negative.  It was likely that her calf

pain was just a muscular cramp as it resolved relatively quickly and she had no 

edema or further calf tenderness.   The CT of the chest also revealed a hepatic 

cyst and left kidney lesion.  We did obtain ultrasound for further 

characterization which revealed bilateral renal cysts and a hepatic cyst.  This 

was discussed with the patient's son.





- ALLERGIES


Allergies/Adverse Reactions: 


                                    Allergies











Allergy/AdvReac Type Severity Reaction Status Date / Time


 


shellfish derived Allergy  Unknown Verified 06/10/21 18:29














- MEDICATIONS


Home Medications: 


                                Ambulatory Orders











 Medication  Instructions  Recorded  Confirmed


 


Spironolactone 25 mg PO BID 10/12/14 06/10/21


 


traMADol [Ultram] 50 mg PO HS 10/12/14 06/10/21


 


Aspirin [[place] Aspirin] 81 mg PO DAILY 06/10/21 06/10/21


 


Magnesium Oxide [Magnesium] 400 mg PO DAILY 06/10/21 06/10/21


 


Nitrofurantoin Macrocrystal 50 mg PO HS 06/10/21 06/10/21





[Macrodantin]   


 


Oxybutynin Chloride [Ditropan Xl] 5 mg PO DAILY 06/10/21 06/10/21


 


dilTIAZem HCL [Diltiazem 24Hr  mg PO DAILY 06/10/21 06/10/21





(Xr)]   


 


Dorzolamide/Timolol Ophth Soln 1 drops EACHEYE BID 06/11/21 06/11/21





[Cosopt]   


 


Latanoprost 0.005% Ophth Drops 1 drops EACHEYE QPM 06/11/21 06/11/21





[Xalatan Ophth Drops]   














- PHYSICAL EXAM AT DISCHARGE


General Appearance: positive: No acute distress, Alert


Eyes Bilateral: positive: Normal inspection, Conjunctivae nml


ENT: positive: ENT inspection nml


Neck: positive: Nml inspection


Respiratory: positive: No respiratory distress.  negative: Wheezes, Rales


Cardiovascular: positive: Regular rate & rhythm, No murmur.  negative: 

Tachycardia


Abdomen: positive: Non-tender, No distention.  negative: Tenderness


Skin: positive: Warm, Dry


Extremities: positive: No pedal edema.  negative: Pedal edema, Calf tenderness, 

Lily's sign/cords


Neurologic/Psychiatric: positive: Motor nml, Disoriented to time.  negative: 

Disoriented to person, Disoriented to place


Physical Exam Other/Comments: 








                               Vital Signs - 24 hr











  06/11/21 06/11/21 06/11/21





  16:17 16:34 18:51


 


Temperature 36.3 C L  


 


Heart Rate [ 61 60 





Brachial]   


 


Respiratory 18  19





Rate   


 


Blood Pressure  157/61 H 183/65 H





[Left Brachial   





artery]   


 


Blood Pressure 168/56 H  





[Right Brachial   





artery]   


 


O2 Saturation 95  97














  06/11/21 06/11/21 06/11/21





  19:46 19:49 20:53


 


Temperature 36.0 C L  


 


Heart Rate [ 67  





Brachial]   


 


Respiratory 20  





Rate   


 


Blood Pressure   





[Left Brachial   





artery]   


 


Blood Pressure 188/66 H 183/61 H 132/58 H





[Right Brachial   





artery]   


 


O2 Saturation 97  














  06/11/21 06/12/21 06/12/21





  23:12 04:49 08:16


 


Temperature 36.2 C L 36.4 C L 36.5 C


 


Heart Rate [ 87 66 73





Brachial]   


 


Respiratory 18 20 18





Rate   


 


Blood Pressure   128/65





[Left Brachial   





artery]   


 


Blood Pressure 144/58 H 133/53 H 





[Right Brachial   





artery]   


 


O2 Saturation 98 98 100














  06/12/21





  12:43


 


Temperature 36.2 C L


 


Heart Rate [ 70





Brachial] 


 


Respiratory 18





Rate 


 


Blood Pressure 118/81 H





[Left Brachial 





artery] 


 


Blood Pressure 





[Right Brachial 





artery] 


 


O2 Saturation 100








                                     Oxygen











O2 Source [With Activity]      Room air


 


O2 Source [Without Activity]   Room air


 


O2 Source                      Room air

















- LABS


Result Diagrams: 


                                 06/12/21 05:09





                                 06/12/21 05:09





- DIAGNOSTIC IMAGING


Diagnostic Imaging Results: Final report reviewed


Diagnostic Imaging Results Comments: 


CT angiogram of the chest on June 11 showed no evidence of pulmonary embolism or

aortic aneurysm.  Cardiomegaly, pacemaker and pulmonary emphysema present.  Low-

density lesion in the upper pole of the left kidney is incompletely assessed and

may reflect solid lesion.  Consider either follow-up contrast CT abdomen pelvis 

or ultrasound for further evaluation.  Simple right hepatic cyst measuring 7 cm.

 Additional 1.4 cm low-density lesion left hepatic lobe with coarse 

calcification may reflect hemangioma or prior granulomatous disease.  Further 

evaluation with contrast CT or ultrasound.  Incidental cholelithiasis partially 

imaged.





- FOLLOW UP


Follow Up: 


She was asked to follow-up with her primary care provider in 1 week to consider 

further imaging of the liver and kidney given the CT findings.  She was also 

asked to contact her cardiologist office to schedule a follow-up appointment as 

recommended by her cardiologist.  This was also discussed with her son given the

patient does have dementia.





- TIME SPENT


Time Spent in Discharge (Minutes): 32

## 2021-09-18 ENCOUNTER — HOSPITAL ENCOUNTER (OUTPATIENT)
Dept: HOSPITAL 76 - EMS | Age: 86
Discharge: ANOTHER HOSPITAL | End: 2021-09-18
Payer: MEDICARE

## 2021-09-18 ENCOUNTER — HOSPITAL ENCOUNTER (EMERGENCY)
Dept: HOSPITAL 76 - ED | Age: 86
Discharge: HOME | End: 2021-09-18
Payer: MEDICARE

## 2021-09-18 VITALS — DIASTOLIC BLOOD PRESSURE: 66 MMHG | SYSTOLIC BLOOD PRESSURE: 140 MMHG

## 2021-09-18 DIAGNOSIS — M54.2: ICD-10-CM

## 2021-09-18 DIAGNOSIS — Z04.3: Primary | ICD-10-CM

## 2021-09-18 DIAGNOSIS — R42: ICD-10-CM

## 2021-09-18 DIAGNOSIS — M54.6: ICD-10-CM

## 2021-09-18 DIAGNOSIS — R41.3: ICD-10-CM

## 2021-09-18 LAB
ALBUMIN DIAFP-MCNC: 4.3 G/DL (ref 3.2–5.5)
ALBUMIN/GLOB SERPL: 1.5 {RATIO} (ref 1–2.2)
ALP SERPL-CCNC: 58 IU/L (ref 42–121)
ALT SERPL W P-5'-P-CCNC: 14 IU/L (ref 10–60)
ANION GAP SERPL CALCULATED.4IONS-SCNC: 10 MMOL/L (ref 6–13)
AST SERPL W P-5'-P-CCNC: 25 IU/L (ref 10–42)
BASOPHILS NFR BLD AUTO: 0 10^3/UL (ref 0–0.1)
BASOPHILS NFR BLD AUTO: 0.6 %
BILIRUB BLD-MCNC: 0.6 MG/DL (ref 0.2–1)
BUN SERPL-MCNC: 25 MG/DL (ref 6–20)
CALCIUM UR-MCNC: 11 MG/DL (ref 8.5–10.3)
CHLORIDE SERPL-SCNC: 99 MMOL/L (ref 101–111)
CO2 SERPL-SCNC: 27 MMOL/L (ref 21–32)
CREAT SERPLBLD-SCNC: 1 MG/DL (ref 0.4–1)
EOSINOPHIL # BLD AUTO: 0.1 10^3/UL (ref 0–0.7)
EOSINOPHIL NFR BLD AUTO: 0.7 %
ERYTHROCYTE [DISTWIDTH] IN BLOOD BY AUTOMATED COUNT: 13.9 % (ref 12–15)
GFRSERPLBLD MDRD-ARVRAT: 52 ML/MIN/{1.73_M2} (ref 89–?)
GLOBULIN SER-MCNC: 2.8 G/DL (ref 2.1–4.2)
GLUCOSE SERPL-MCNC: 135 MG/DL (ref 70–100)
HCT VFR BLD AUTO: 36.7 % (ref 37–47)
HGB UR QL STRIP: 12.4 G/DL (ref 12–16)
LIPASE SERPL-CCNC: 34 U/L (ref 22–51)
LYMPHOCYTES # SPEC AUTO: 0.8 10^3/UL (ref 1.5–3.5)
LYMPHOCYTES NFR BLD AUTO: 11.4 %
MAGNESIUM SERPL-MCNC: 2.3 MG/DL (ref 1.7–2.8)
MCH RBC QN AUTO: 30 PG (ref 27–31)
MCHC RBC AUTO-ENTMCNC: 33.8 G/DL (ref 32–36)
MCV RBC AUTO: 88.6 FL (ref 81–99)
MONOCYTES # BLD AUTO: 0.7 10^3/UL (ref 0–1)
MONOCYTES NFR BLD AUTO: 9.6 %
NEUTROPHILS # BLD AUTO: 5.2 10^3/UL (ref 1.5–6.6)
NEUTROPHILS # SNV AUTO: 6.7 X10^3/UL (ref 4.8–10.8)
NEUTROPHILS NFR BLD AUTO: 77.1 %
NRBC # BLD AUTO: 0 /100WBC
NRBC # BLD AUTO: 0 X10^3/UL
PDW BLD AUTO: 9.8 FL (ref 7.9–10.8)
PLATELET # BLD: 218 10^3/UL (ref 130–450)
POTASSIUM SERPL-SCNC: 3.7 MMOL/L (ref 3.5–5)
PROT SPEC-MCNC: 7.1 G/DL (ref 6.7–8.2)
RBC MAR: 4.14 10^6/UL (ref 4.2–5.4)
SODIUM SERPLBLD-SCNC: 136 MMOL/L (ref 135–145)

## 2021-09-18 PROCEDURE — 85025 COMPLETE CBC W/AUTO DIFF WBC: CPT

## 2021-09-18 PROCEDURE — 36415 COLL VENOUS BLD VENIPUNCTURE: CPT

## 2021-09-18 PROCEDURE — 80053 COMPREHEN METABOLIC PANEL: CPT

## 2021-09-18 PROCEDURE — 83690 ASSAY OF LIPASE: CPT

## 2021-09-18 PROCEDURE — 99283 EMERGENCY DEPT VISIT LOW MDM: CPT

## 2021-09-18 PROCEDURE — 83735 ASSAY OF MAGNESIUM: CPT

## 2021-09-18 PROCEDURE — 99284 EMERGENCY DEPT VISIT MOD MDM: CPT

## 2021-09-18 NOTE — CT REPORT
PROCEDURE:  HEAD WO

 

INDICATIONS:  fall, head/neck pain

 

TECHNIQUE:  

Noncontrast 4.5 mm thick angled axial sections acquired from the foramen magnum to the vertex.  For r
adiation dose reduction, the following was used:  automated exposure control, adjustment of mA and/or
 kV according to patient size.

 

COMPARISON:  5/4/2018, 1/9/2017. Correlation is also made with the accompanying cervical spine CT exa
mination, 9/18/2021.

 

FINDINGS:  

Image quality:  There is streak artifact seen through the skull base.   

 

CSF spaces:  Basal cisterns are patent.  No extra-axial fluid collections.  Ventricles are normal in 
size and shape.  

 

Brain:  No midline shift.  No intracranial masses or hemorrhage.  Gray-white matter interface is norm
al.  

 

Skull and face:  There is a 2 mm metallic foreign body seen involving the right anterior orbit, as on
 series 7 image 2, as previously demonstrated. There is associated streak artifact. Calvarium and vis
ualized facial bones are intact, without suspicious lesions.  

 

Sinuses:  Visualized sinuses and mastoids are clear.  

 

 

 

 

 

IMPRESSION:  No intracranial hemorrhage is seen.   

 

No significant intracranial abnormality is seen.   

 

There is a 2 mm metallic foreign body seen involving the right anterior orbit, which is similar to pr
iors. Based upon this focus, MRI would be considered to be contraindicated until cleared by ophthalmo
logy.

 

Reviewed by: Abdirizak Mora MD on 9/18/2021 2:12 PM CHET

Approved by: Abdirizak Mora MD on 9/18/2021 2:12 PM CHET

 

 

Station ID:  [id number]

## 2021-09-18 NOTE — CT REPORT
PROCEDURE:  CERVICAL SPINE WO

 

INDICATIONS:  fall, head/neck pain

 

TECHNIQUE:  

Noncontrast 3 mm thick sections acquired from the skull base to the T4 level.  Sagittal and coronal r
eformats were then constructed.  For radiation dose reduction, the following was used:  automated exp
osure control, adjustment of mA and/or kV according to patient size.

 

COMPARISON: 11/27/2020 Correlation is also made with the accompanying head CT, 9/18/2021.

 

FINDINGS:  

Image quality:  Excellent.  

 

Bones:  No fractures or dislocations.  Visualized superior ribs are intact.  

 

Degenerative changes can be seen, with at least moderate disc space narrowing seen at C5-C6 and C6-C7
. Mild to moderate disc space narrowing is seen at C7-T1. Partially bridging anterior osteophytes are
 seen inferiorly. Focal degenerative change can also be seen involving the C1-C2 interface anteriorly
. Milder degenerative changes are seen elsewhere.  

 

Soft tissues:  Prevertebral soft tissues are normal in thickness.  No paravertebral hematomas.  No ap
ical pneumothoraces.  Enlargement of the left thyroid lobe is again seen, with a 2.3 cm nodule noted.
 Left-sided pacer leads are partially seen. Atherosclerotic calcification is seen.

 

 

 

IMPRESSION:  

 

No acute fracture is seen.

 

Degenerative changes are again seen, which are worst inferiorly.

 

 

Incidental note is made of:

Left-sided thyroid nodules

Pacer leads

 

Reviewed by: Abdirizak Mora MD on 9/18/2021 2:09 PM CHET

Approved by: Abdirizak Mora MD on 9/18/2021 2:09 PM CHET

 

 

Station ID:  [id number]

## 2021-09-18 NOTE — ED PHYSICIAN DOCUMENTATION
PD HPI Fall





- Stated complaint


Stated Complaint: GLF





- Chief complaint


Chief Complaint: Trauma Hd/Nk





- History obtained from


History obtained from: Patient, Family, EMS





- History of Present Illness


Mechanism of injury: Unknown (The patient was at home and her son was in the 

next room.  He got up to go to the bathroom and noted her laying on the floor.  

He had not heard a bang or fall. She was not sure why she was on the floor.)


Fall distance: Standing position


Where injury occurred: Home


Timing - onset: Today (patient does not recall if fell, with poor memory at 

baseline. She has some neck pain. Mild headache, so can presume fall. Feeling 

okay earlier in the day. No recent illness reported.)


Injury(ies) location: Neck.  No: Head


Quality of pain: Pain, Dull


Associated symptoms: Other (dementia/ baseline poor memory, so she does not 

remember how ended on floor.).  No: LOC, AMS


Symptoms improve with: Rest


Contributing factors: No: Anticoagulated


Similar symptoms before: Has not had sx before


Recently seen: Not recently seen





Review of Systems


Unable to obtain: Dementia, Other (info from son and EMS.)


Constitutional: denies: Fever


Cardiac: denies: Chest pain / pressure


Respiratory: denies: Dyspnea, Cough


GI: denies: Abdominal Pain, Vomiting, Diarrhea


Skin: denies: Abrasion (s), Laceration (s)


Musculoskeletal: reports: Neck pain (right side chronically).  denies: Back pain


Neurologic: reports: Confused (at baseline), Headache (mild frontal, per 

patient.).  denies: Focal weakness, Altered mental status





PD PAST MEDICAL HISTORY





- Past Medical History


Cardiovascular: Hypertension, Deep vein thrombosis, Pulmonary embolism, Atrial 

fibrillation (Sinus bradycardia or heart block resulting in syncope and collapse

in her PCP office.  Status post pacemaker)


Respiratory: None


Neuro: Dementia (had owning with ORIF  admit), Fainting (Echocardiogram 

the left ventricular EF 50-55%.  Right ventricular pacing.  RV normal size and 

function.  Mild to moderate aortic regurgitation.)


Endocrine/Autoimmune: None


GI: None


GYN: Other ()


: None


HEENT: None


Psych: None


Musculoskeletal: Osteoarthritis, Osteoporosis (fall with right hip fx, ORIF 

)


Derm: Other





- Past Surgical History


Past Surgical History: Yes


Ortho: Knee replacement


/GYN: Hysterectomy


Cardiovascular: Pacemaker





- Present Medications


Home Medications: 


                                Ambulatory Orders











 Medication  Instructions  Recorded  Confirmed


 


Spironolactone 25 mg PO BID 10/12/14 06/10/21


 


traMADol [Ultram] 50 mg PO HS 10/12/14 06/10/21


 


Aspirin [[place] Aspirin] 81 mg PO DAILY 06/10/21 06/10/21


 


Magnesium Oxide [Magnesium] 400 mg PO DAILY 06/10/21 06/10/21


 


Nitrofurantoin Macrocrystal 50 mg PO HS 06/10/21 06/10/21





[Macrodantin]   


 


Oxybutynin Chloride [Ditropan Xl] 5 mg PO DAILY 06/10/21 06/10/21


 


dilTIAZem HCL [Diltiazem 24Hr  mg PO DAILY 06/10/21 06/10/21





(Xr)]   


 


Dorzolamide/Timolol Ophth Soln 1 drops EACHEYE BID 21





[Cosopt]   


 


Latanoprost 0.005% Ophth Drops 1 drops EACHEYE QPM 21





[Xalatan Ophth Drops]   














- Allergies


Allergies/Adverse Reactions: 


                                    Allergies











Allergy/AdvReac Type Severity Reaction Status Date / Time


 


shellfish derived Allergy  Unknown Verified 21 13:51














- Social History


Does the pt smoke?: No


Smoking Status: Never smoker


Does the pt drink ETOH?: No


Does the pt have substance abuse?: No





- Immunizations


Immunizations are current?: Yes





- POLST


Patient has POLST: No


POLST Status: Full Code





PD ED PE NORMAL





- Vitals


Vital signs reviewed: Yes





- General


General: Alert and oriented X 3, No acute distress, Well developed/nourished





- HEENT


HEENT: Atraumatic, Moist mucous membranes, Pharynx benign





- Neck


Neck: Supple, no meningeal sign, No bony TTP, No adenopathy





- Cardiac


Cardiac: RRR, No murmur





- Respiratory


Respiratory: Clear bilaterally





- Abdomen


Abdomen: Soft, Non tender





- Derm


Derm: Normal color, Warm and dry, No rash





- Extremities


Extremities: No tenderness to palpate, No edema, No calf tenderness / cord





- Neuro


Neuro: No motor deficit, Normal speech.  No: Alert and oriented X 3 (to person 

and place. She is alert and conversant. Does not remember events of being on 

floor. )





Results





- Vitals


Vitals: 


                               Vital Signs - 24 hr











  21





  13:51 15:54


 


Temperature 36.0 C L 36.5 C


 


Heart Rate 70 70


 


Respiratory 18 16





Rate  


 


Blood Pressure 157/69 H 140/66 H


 


O2 Saturation 98 100








                                     Oxygen











O2 Source [With Activity]      Room air


 


O2 Source [Without Activity]   Room air


 


O2 Source                      Room air

















- Labs


Labs: 


                                Laboratory Tests











  21





  14:19 14:19


 


WBC  6.7 


 


RBC  4.14 L 


 


Hgb  12.4 


 


Hct  36.7 L 


 


MCV  88.6 


 


MCH  30.0 


 


MCHC  33.8 


 


RDW  13.9 


 


Plt Count  218 


 


MPV  9.8 


 


Neut # (Auto)  5.2 


 


Lymph # (Auto)  0.8 L 


 


Mono # (Auto)  0.7 


 


Eos # (Auto)  0.1 


 


Baso # (Auto)  0.0 


 


Absolute Nucleated RBC  0.00 


 


Nucleated RBC %  0.0 


 


Sodium   136


 


Potassium   3.7


 


Chloride   99 L


 


Carbon Dioxide   27


 


Anion Gap   10.0


 


BUN   25 H


 


Creatinine   1.0


 


Estimated GFR (MDRD)   52 L


 


Glucose   135 H


 


Calcium   11.0 H


 


Magnesium   2.3


 


Total Bilirubin   0.6


 


AST   25


 


ALT   14


 


Alkaline Phosphatase   58


 


Total Protein   7.1


 


Albumin   4.3


 


Globulin   2.8


 


Albumin/Globulin Ratio   1.5


 


Lipase   34














- Rads (name of study)


  ** head CT


Radiology: Prelim report reviewed (no acute process), See rad report





  ** cervical CT


Radiology: Prelim report reviewed (no fracfture; DDD noted. ), See rad report





PD MEDICAL DECISION MAKING





- ED course


Complexity details: reviewed results, considered differential (.  She is unaware

why she is  On the floor.)





Departure





- Departure


Disposition: 01 Home, Self Care


Clinical Impression: 


Fall


Qualifiers:


 Encounter type: initial encounter Qualified Code(s): W19.XXXA - Unspecified 

fall, initial encounter





Condition: Stable


Record reviewed to determine appropriate education?: Yes


Comments: 


Your vital signs and basic blood tests as well as a CT of your head and neck are

normal.  No signs of bony injury to the neck.  No signs of bleeding or swelling 

in the head.  It is unclear why you were down on the floor.  No obvious acute 

abnormality seen.





Stay well-hydrated.  Continue usual medicines.  Follow-up with your primary care

if needed.


Discharge Date/Time: 21 15:54

## 2021-10-30 ENCOUNTER — HOSPITAL ENCOUNTER (EMERGENCY)
Dept: HOSPITAL 76 - ED | Age: 86
Discharge: LEFT BEFORE DISCHARGE | End: 2021-10-30
Payer: MEDICARE

## 2021-10-30 VITALS — DIASTOLIC BLOOD PRESSURE: 66 MMHG | SYSTOLIC BLOOD PRESSURE: 172 MMHG

## 2021-10-30 DIAGNOSIS — R10.31: Primary | ICD-10-CM

## 2021-10-30 DIAGNOSIS — N30.90: ICD-10-CM

## 2021-10-30 LAB
ALBUMIN DIAFP-MCNC: 4.4 G/DL (ref 3.2–5.5)
ALBUMIN/GLOB SERPL: 1.8 {RATIO} (ref 1–2.2)
ALP SERPL-CCNC: 59 IU/L (ref 42–121)
ALT SERPL W P-5'-P-CCNC: 15 IU/L (ref 10–60)
ANION GAP SERPL CALCULATED.4IONS-SCNC: 10 MMOL/L (ref 6–13)
AST SERPL W P-5'-P-CCNC: 22 IU/L (ref 10–42)
BASOPHILS NFR BLD AUTO: 0.1 10^3/UL (ref 0–0.1)
BASOPHILS NFR BLD AUTO: 0.9 %
BILIRUB BLD-MCNC: 0.9 MG/DL (ref 0.2–1)
BUN SERPL-MCNC: 30 MG/DL (ref 6–20)
CALCIUM UR-MCNC: 9.5 MG/DL (ref 8.5–10.3)
CHLORIDE SERPL-SCNC: 100 MMOL/L (ref 101–111)
CLARITY UR REFRACT.AUTO: (no result)
CO2 SERPL-SCNC: 25 MMOL/L (ref 21–32)
CREAT SERPLBLD-SCNC: 0.8 MG/DL (ref 0.4–1)
EOSINOPHIL # BLD AUTO: 0.1 10^3/UL (ref 0–0.7)
EOSINOPHIL NFR BLD AUTO: 2.1 %
ERYTHROCYTE [DISTWIDTH] IN BLOOD BY AUTOMATED COUNT: 14.1 % (ref 12–15)
GFRSERPLBLD MDRD-ARVRAT: 68 ML/MIN/{1.73_M2} (ref 89–?)
GLOBULIN SER-MCNC: 2.5 G/DL (ref 2.1–4.2)
GLUCOSE SERPL-MCNC: 116 MG/DL (ref 70–100)
GLUCOSE UR QL STRIP.AUTO: NEGATIVE MG/DL
HCT VFR BLD AUTO: 36.1 % (ref 37–47)
HGB UR QL STRIP: 12.1 G/DL (ref 12–16)
KETONES UR QL STRIP.AUTO: NEGATIVE MG/DL
LIPASE SERPL-CCNC: 56 U/L (ref 22–51)
LYMPHOCYTES # SPEC AUTO: 1.5 10^3/UL (ref 1.5–3.5)
LYMPHOCYTES NFR BLD AUTO: 26.4 %
MCH RBC QN AUTO: 30.5 PG (ref 27–31)
MCHC RBC AUTO-ENTMCNC: 33.5 G/DL (ref 32–36)
MCV RBC AUTO: 90.9 FL (ref 81–99)
MONOCYTES # BLD AUTO: 0.7 10^3/UL (ref 0–1)
MONOCYTES NFR BLD AUTO: 11.1 %
NEUTROPHILS # BLD AUTO: 3.5 10^3/UL (ref 1.5–6.6)
NEUTROPHILS # SNV AUTO: 5.8 X10^3/UL (ref 4.8–10.8)
NEUTROPHILS NFR BLD AUTO: 59.3 %
NITRITE UR QL STRIP.AUTO: POSITIVE
NRBC # BLD AUTO: 0 /100WBC
NRBC # BLD AUTO: 0 X10^3/UL
PDW BLD AUTO: 9.5 FL (ref 7.9–10.8)
PH UR STRIP.AUTO: 7 PH (ref 5–7.5)
PLATELET # BLD: 251 10^3/UL (ref 130–450)
POTASSIUM SERPL-SCNC: 4.4 MMOL/L (ref 3.5–5)
PROT SPEC-MCNC: 6.9 G/DL (ref 6.7–8.2)
PROT UR STRIP.AUTO-MCNC: (no result) MG/DL
RBC # UR STRIP.AUTO: (no result) /UL
RBC # URNS HPF: (no result) /HPF (ref 0–5)
RBC MAR: 3.97 10^6/UL (ref 4.2–5.4)
SODIUM SERPLBLD-SCNC: 135 MMOL/L (ref 135–145)
SP GR UR STRIP.AUTO: 1.02 (ref 1–1.03)
SQUAMOUS URNS QL MICRO: (no result)
UROBILINOGEN UR QL STRIP.AUTO: (no result) E.U./DL
UROBILINOGEN UR STRIP.AUTO-MCNC: NEGATIVE MG/DL
WBC # UR MANUAL: >25 /HPF (ref 0–5)

## 2021-10-30 PROCEDURE — 36415 COLL VENOUS BLD VENIPUNCTURE: CPT

## 2021-10-30 PROCEDURE — 85025 COMPLETE CBC W/AUTO DIFF WBC: CPT

## 2021-10-30 PROCEDURE — 81001 URINALYSIS AUTO W/SCOPE: CPT

## 2021-10-30 PROCEDURE — 81003 URINALYSIS AUTO W/O SCOPE: CPT

## 2021-10-30 PROCEDURE — 99284 EMERGENCY DEPT VISIT MOD MDM: CPT

## 2021-10-30 PROCEDURE — 87077 CULTURE AEROBIC IDENTIFY: CPT

## 2021-10-30 PROCEDURE — 87181 SC STD AGAR DILUTION PER AGT: CPT

## 2021-10-30 PROCEDURE — 83690 ASSAY OF LIPASE: CPT

## 2021-10-30 PROCEDURE — 80053 COMPREHEN METABOLIC PANEL: CPT

## 2021-10-30 PROCEDURE — 83605 ASSAY OF LACTIC ACID: CPT

## 2021-10-30 PROCEDURE — 99283 EMERGENCY DEPT VISIT LOW MDM: CPT

## 2021-10-30 PROCEDURE — 87086 URINE CULTURE/COLONY COUNT: CPT

## 2021-10-30 NOTE — ED PHYSICIAN DOCUMENTATION
History of Present Illness





- Stated complaint


Stated Complaint: ABD PAIN





- Chief complaint


Chief Complaint: Abd Pain





- Additonal information


Additional information: 


88-year-old female presents to the ER for evaluation of 4 days right lower qu

adrant abdominal pain.  There have been no fevers or vomiting.  She reports that

there is a sore spot on her right abdomen that can really be painful if she 

touches it.  Her primary care provider referred her to the ER in [place] on 

Wednesday with a negative work-up.  Per the son no CT imaging was performed.





No recent dysuria urgency or frequency.  History is somewhat limited from the 

patient as she carries a history of dementia.








Review of Systems


Unable to obtain: Dementia


Constitutional: denies: Fever, Chills


Throat: reports: Reviewed and negative


Cardiac: reports: Reviewed and negative


Respiratory: reports: Reviewed and negative


GI: reports: Abdominal Pain.  denies: Nausea, Vomiting


: denies: Dysuria, Hesitancy


Skin: reports: Reviewed and negative


Musculoskeletal: reports: Reviewed and negative


Neurologic: reports: Reviewed and negative





PD PAST MEDICAL HISTORY





- Past Medical History


Past Medical History: Yes


Cardiovascular: Hypertension, Deep vein thrombosis, Pulmonary embolism, Atrial 

fibrillation


Respiratory: None


Neuro: Dementia, Fainting


Endocrine/Autoimmune: None


GI: None


GYN: Other


: None


HEENT: None


Psych: None


Musculoskeletal: Osteoarthritis, Osteoporosis


Derm: Other





- Past Surgical History


Past Surgical History: Yes


Ortho: Knee replacement


/GYN: Hysterectomy


Cardiovascular: Pacemaker





- Present Medications


Home Medications: 


                                Ambulatory Orders











 Medication  Instructions  Recorded  Confirmed


 


Spironolactone 25 mg PO BID 10/12/14 06/10/21


 


Aspirin [[place] Aspirin] 81 mg PO DAILY 06/10/21 06/10/21


 


Magnesium Oxide [Magnesium] 400 mg PO DAILY 06/10/21 06/10/21


 


Nitrofurantoin Macrocrystal 50 mg PO HS 06/10/21 06/10/21





[Macrodantin]   


 


Oxybutynin Chloride [Ditropan Xl] 5 mg PO DAILY 06/10/21 06/10/21


 


dilTIAZem HCL [Diltiazem 24Hr  mg PO DAILY 06/10/21 06/10/21





(Xr)]   


 


Cefpodoxime Proxetil [Vantin] 100 mg PO Q12H #14 tablet 10/30/21 














- Allergies


Allergies/Adverse Reactions: 


                                    Allergies











Allergy/AdvReac Type Severity Reaction Status Date / Time


 


shellfish derived Allergy  Unknown Verified 10/30/21 14:40














- Social History


Does the pt smoke?: No


Smoking Status: Never smoker


Does the pt drink ETOH?: No


Does the pt have substance abuse?: No





- Immunizations


Immunizations are current?: Yes





- POLST


Patient has POLST: No


POLST Status: Full Code





PD ED PE EXPANDED





- General


General: Alert, No acute distress





- Cardiac


Cardiac: Regular Rate, Radial strong equal, Pedal strong equal, Cap refill < 2 

sec





- Respiratory


Respiratory: Clear to ausultation yaz.  No: Distress, Labored





- Abdomen


Abdomen: Normal Bowel sounds, Tender to palpation, Rebound, RLQ (equivocal 

mcburneys).  No: Guarding





- Derm


Derm: Normal color, Warm and dry





- Neuro


Neuro: Alert and Oriented X 3, CNII-XII intact





- GCS


Eye Opening: Spontaneous


Motor: Obeys Commands


Verbal: Confused


Total: 14





Results





- Vitals


Vitals: 


                               Vital Signs - 24 hr











  10/30/21





  14:37


 


Temperature 36.5 C


 


Heart Rate 65


 


Respiratory 18





Rate 


 


Blood Pressure 172/66 H


 


O2 Saturation 100








                                     Oxygen











O2 Source [With Activity]      Room air


 


O2 Source [Without Activity]   Room air


 


O2 Source                      Room air

















- Labs


Labs: 


                                Laboratory Tests











  10/30/21 10/30/21 10/30/21





  14:55 14:55 14:55


 


WBC  5.8  


 


RBC  3.97 L  


 


Hgb  12.1  


 


Hct  36.1 L  


 


MCV  90.9  


 


MCH  30.5  


 


MCHC  33.5  


 


RDW  14.1  


 


Plt Count  251  


 


MPV  9.5  


 


Neut # (Auto)  3.5  


 


Lymph # (Auto)  1.5  


 


Mono # (Auto)  0.7  


 


Eos # (Auto)  0.1  


 


Baso # (Auto)  0.1  


 


Absolute Nucleated RBC  0.00  


 


Nucleated RBC %  0.0  


 


Sodium   135 


 


Potassium   4.4 


 


Chloride   100 L 


 


Carbon Dioxide   25 


 


Anion Gap   10.0 


 


BUN   30 H 


 


Creatinine   0.8 


 


Estimated GFR (MDRD)   68 L 


 


Glucose   116 H 


 


Lactic Acid    1.1


 


Calcium   9.5 


 


Total Bilirubin   0.9 


 


AST   22 


 


ALT   15 


 


Alkaline Phosphatase   59 


 


Total Protein   6.9 


 


Albumin   4.4 


 


Globulin   2.5 


 


Albumin/Globulin Ratio   1.8 


 


Lipase   56 H 


 


Urine Color   


 


Urine Clarity   


 


Urine pH   


 


Ur Specific Gravity   


 


Urine Protein   


 


Urine Glucose (UA)   


 


Urine Ketones   


 


Urine Occult Blood   


 


Urine Nitrite   


 


Urine Bilirubin   


 


Urine Urobilinogen   


 


Ur Leukocyte Esterase   


 


Urine RBC   


 


Urine WBC   


 


Ur Squamous Epith Cells   


 


Urine Bacteria   


 


Ur Microscopic Review   


 


Urine Culture Comments   














  10/30/21





  15:11


 


WBC 


 


RBC 


 


Hgb 


 


Hct 


 


MCV 


 


MCH 


 


MCHC 


 


RDW 


 


Plt Count 


 


MPV 


 


Neut # (Auto) 


 


Lymph # (Auto) 


 


Mono # (Auto) 


 


Eos # (Auto) 


 


Baso # (Auto) 


 


Absolute Nucleated RBC 


 


Nucleated RBC % 


 


Sodium 


 


Potassium 


 


Chloride 


 


Carbon Dioxide 


 


Anion Gap 


 


BUN 


 


Creatinine 


 


Estimated GFR (MDRD) 


 


Glucose 


 


Lactic Acid 


 


Calcium 


 


Total Bilirubin 


 


AST 


 


ALT 


 


Alkaline Phosphatase 


 


Total Protein 


 


Albumin 


 


Globulin 


 


Albumin/Globulin Ratio 


 


Lipase 


 


Urine Color  YELLOW


 


Urine Clarity  SL. CLOUDY


 


Urine pH  7.0


 


Ur Specific Gravity  1.020


 


Urine Protein  TRACE


 


Urine Glucose (UA)  NEGATIVE


 


Urine Ketones  NEGATIVE


 


Urine Occult Blood  TRACE-LYSE


 


Urine Nitrite  POSITIVE H


 


Urine Bilirubin  NEGATIVE


 


Urine Urobilinogen  0.2 (NORMAL)


 


Ur Leukocyte Esterase  LARGE H


 


Urine RBC  6-10 H


 


Urine WBC  >25 H


 


Ur Squamous Epith Cells  FEW Squamous


 


Urine Bacteria  Few


 


Ur Microscopic Review  INDICATED


 


Urine Culture Comments  INDICATED














PD MEDICAL DECISION MAKING





- ED course


Complexity details: considered differential, d/w patient, d/w family


ED course: 


88-year-old female presents the emergency department for evaluation of 4 days 

right lower quadrant abdominal pain.  No fevers nausea or vomiting.  She denies 

urinary symptoms.  She was seen at [facility] a few days ago with a 

negative work-up.





Approximately 15 minutes after presenting to the emergency department and being 

roomed the patient and her son were both insistent that she be allowed to leave.

 She denied that she had any further abdominal pain.  In fact they requested to 

be discharged AGAINST MEDICAL ADVICE.  At that time I was in receipt of her 

screening labs that showed a urinalysis most consistent with infection despite 

patient's lack of symptoms.





I discussed with the patient and her son the I suspected her symptoms were due 

to urinary infection but could not be certain. I was also concerned that she 

could have acute appendicitis, sbo or diverticulosis/-itis I would still like to

proceed with a CT scan.  However they declined this.  They did accept a 

prescription for antibiotics which were [facility] [place].  They

understood that the work-up was not complete.  I encouraged them to return to 

the emergency department for any further emergent or more worrisome symptoms.








Departure





- Departure


Disposition: 01 Home, Self Care


Clinical Impression: 


 RLQ abdominal pain, Cystitis





Condition: Stable


Record reviewed to determine appropriate education?: Yes


Instructions:  ED UTI Cystitis Female


Follow-Up: 


Colby Kirk MD [Physician No Access] - 


Prescriptions: 


Cefpodoxime Proxetil [Vantin] 100 mg PO Q12H #14 tablet


Comments: 


Stacy was seen in the emergency department today for 4 days of right lower 

quadrant abdominal pain.





Today in the emergency department she had a urine analysis that is concerning 

for infection.  I have recommended that she remain in the emergency department 

to get a CAT scan completed however you have declined to remain here.





I am sending a prescription for an antibiotic called Cefpodoxime to the [facility] 

in [place].  Please fill it this afternoon and begin taking twice daily.





You are leaving with the understanding that your work-up is not complete.  If at

 any point your symptoms are worsening you must return immediately to the ER.  

Continue close follow-up with Dr. Dewey.

## 2021-12-08 ENCOUNTER — HOSPITAL ENCOUNTER (EMERGENCY)
Dept: HOSPITAL 76 - ED | Age: 86
Discharge: HOME | End: 2021-12-08
Payer: MEDICARE

## 2021-12-08 ENCOUNTER — HOSPITAL ENCOUNTER (OUTPATIENT)
Dept: HOSPITAL 76 - EMS | Age: 86
Discharge: ANOTHER HOSPITAL | End: 2021-12-08
Payer: MEDICARE

## 2021-12-08 VITALS — SYSTOLIC BLOOD PRESSURE: 153 MMHG | DIASTOLIC BLOOD PRESSURE: 72 MMHG
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
## 2021-12-08 DIAGNOSIS — Z86.718: ICD-10-CM

## 2021-12-08 DIAGNOSIS — M54.2: Primary | ICD-10-CM

## 2021-12-08 DIAGNOSIS — S20.229A: Primary | ICD-10-CM

## 2021-12-08 DIAGNOSIS — I10: ICD-10-CM
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
## 2021-12-08 DIAGNOSIS — N30.00: ICD-10-CM

## 2021-12-08 DIAGNOSIS — Z95.0: ICD-10-CM

## 2021-12-08 DIAGNOSIS — I48.91: ICD-10-CM

## 2021-12-08 LAB
ALBUMIN DIAFP-MCNC: 3.9 G/DL (ref 3.2–5.5)
ALBUMIN/GLOB SERPL: 1.3 {RATIO} (ref 1–2.2)
ALP SERPL-CCNC: 50 IU/L (ref 42–121)
ALT SERPL W P-5'-P-CCNC: 15 IU/L (ref 10–60)
ANION GAP SERPL CALCULATED.4IONS-SCNC: 10 MMOL/L (ref 6–13)
AST SERPL W P-5'-P-CCNC: 24 IU/L (ref 10–42)
BASOPHILS NFR BLD AUTO: 0 10^3/UL (ref 0–0.1)
BASOPHILS NFR BLD AUTO: 0.7 %
BILIRUB BLD-MCNC: 0.9 MG/DL (ref 0.2–1)
BUN SERPL-MCNC: 34 MG/DL (ref 6–20)
CALCIUM UR-MCNC: 11.6 MG/DL (ref 8.5–10.3)
CHLORIDE SERPL-SCNC: 100 MMOL/L (ref 101–111)
CLARITY UR REFRACT.AUTO: (no result)
CO2 SERPL-SCNC: 25 MMOL/L (ref 21–32)
CREAT SERPLBLD-SCNC: 1 MG/DL (ref 0.4–1)
EOSINOPHIL # BLD AUTO: 0.1 10^3/UL (ref 0–0.7)
EOSINOPHIL NFR BLD AUTO: 1.5 %
ERYTHROCYTE [DISTWIDTH] IN BLOOD BY AUTOMATED COUNT: 13.8 % (ref 12–15)
GFRSERPLBLD MDRD-ARVRAT: 52 ML/MIN/{1.73_M2} (ref 89–?)
GLOBULIN SER-MCNC: 2.9 G/DL (ref 2.1–4.2)
GLUCOSE SERPL-MCNC: 106 MG/DL (ref 70–100)
GLUCOSE UR QL STRIP.AUTO: NEGATIVE MG/DL
HCT VFR BLD AUTO: 34.6 % (ref 37–47)
HGB UR QL STRIP: 12 G/DL (ref 12–16)
KETONES UR QL STRIP.AUTO: NEGATIVE MG/DL
LIPASE SERPL-CCNC: 35 U/L (ref 22–51)
LYMPHOCYTES # SPEC AUTO: 1.2 10^3/UL (ref 1.5–3.5)
LYMPHOCYTES NFR BLD AUTO: 21.6 %
MAGNESIUM SERPL-MCNC: 2.1 MG/DL (ref 1.7–2.8)
MCH RBC QN AUTO: 30.8 PG (ref 27–31)
MCHC RBC AUTO-ENTMCNC: 34.7 G/DL (ref 32–36)
MCV RBC AUTO: 88.7 FL (ref 81–99)
MONOCYTES # BLD AUTO: 0.6 10^3/UL (ref 0–1)
MONOCYTES NFR BLD AUTO: 11.9 %
NEUTROPHILS # BLD AUTO: 3.4 10^3/UL (ref 1.5–6.6)
NEUTROPHILS # SNV AUTO: 5.4 X10^3/UL (ref 4.8–10.8)
NEUTROPHILS NFR BLD AUTO: 63.7 %
NITRITE UR QL STRIP.AUTO: POSITIVE
NRBC # BLD AUTO: 0 /100WBC
NRBC # BLD AUTO: 0 X10^3/UL
PDW BLD AUTO: 9.5 FL (ref 7.9–10.8)
PH UR STRIP.AUTO: 7 PH (ref 5–7.5)
PLATELET # BLD: 207 10^3/UL (ref 130–450)
POTASSIUM SERPL-SCNC: 3.9 MMOL/L (ref 3.5–5)
PROT SPEC-MCNC: 6.8 G/DL (ref 6.7–8.2)
PROT UR STRIP.AUTO-MCNC: NEGATIVE MG/DL
RBC # UR STRIP.AUTO: (no result) /UL
RBC # URNS HPF: (no result) /HPF (ref 0–5)
RBC MAR: 3.9 10^6/UL (ref 4.2–5.4)
SODIUM SERPLBLD-SCNC: 135 MMOL/L (ref 135–145)
SP GR UR STRIP.AUTO: 1.01 (ref 1–1.03)
SQUAMOUS URNS QL MICRO: (no result)
UROBILINOGEN UR QL STRIP.AUTO: (no result) E.U./DL
UROBILINOGEN UR STRIP.AUTO-MCNC: NEGATIVE MG/DL
WBC # UR MANUAL: >25 /HPF (ref 0–5)
WBC CLUMPS URNS QL MICRO: PRESENT

## 2021-12-08 PROCEDURE — 70450 CT HEAD/BRAIN W/O DYE: CPT

## 2021-12-08 PROCEDURE — 96374 THER/PROPH/DIAG INJ IV PUSH: CPT

## 2021-12-08 PROCEDURE — 96375 TX/PRO/DX INJ NEW DRUG ADDON: CPT

## 2021-12-08 PROCEDURE — 81003 URINALYSIS AUTO W/O SCOPE: CPT

## 2021-12-08 PROCEDURE — 99283 EMERGENCY DEPT VISIT LOW MDM: CPT

## 2021-12-08 PROCEDURE — 83690 ASSAY OF LIPASE: CPT

## 2021-12-08 PROCEDURE — 72128 CT CHEST SPINE W/O DYE: CPT

## 2021-12-08 PROCEDURE — 99284 EMERGENCY DEPT VISIT MOD MDM: CPT

## 2021-12-08 PROCEDURE — 87086 URINE CULTURE/COLONY COUNT: CPT

## 2021-12-08 PROCEDURE — 72131 CT LUMBAR SPINE W/O DYE: CPT

## 2021-12-08 PROCEDURE — 84443 ASSAY THYROID STIM HORMONE: CPT

## 2021-12-08 PROCEDURE — 87181 SC STD AGAR DILUTION PER AGT: CPT

## 2021-12-08 PROCEDURE — 72125 CT NECK SPINE W/O DYE: CPT

## 2021-12-08 PROCEDURE — 83735 ASSAY OF MAGNESIUM: CPT

## 2021-12-08 PROCEDURE — 81001 URINALYSIS AUTO W/SCOPE: CPT

## 2021-12-08 PROCEDURE — 36415 COLL VENOUS BLD VENIPUNCTURE: CPT

## 2021-12-08 PROCEDURE — 87077 CULTURE AEROBIC IDENTIFY: CPT

## 2021-12-08 PROCEDURE — 80053 COMPREHEN METABOLIC PANEL: CPT

## 2021-12-08 PROCEDURE — 85025 COMPLETE CBC W/AUTO DIFF WBC: CPT

## 2021-12-08 NOTE — CT REPORT
PROCEDURE:  CERVICAL SPINE WO

 

INDICATIONS:  fall with head/neck/back pain

 

TECHNIQUE:  

Noncontrast 3 mm thick sections acquired from the skull base to the T4 level.  Sagittal and coronal r
eformats were then constructed.  For radiation dose reduction, the following was used:  automated exp
osure control, adjustment of mA and/or kV according to patient size.

 

COMPARISON:  None.

 

FINDINGS:  

Image quality:  Excellent.  

 

Bones:  No fractures or subluxation. There is minimal anterolisthesis of C7-T1. There are erosive anastasiya
nges within the odontoid suggestive of an inflammatory arthropathy. There is mild to moderate degener
ative disc disease within the lower cervical spine. Moderate facet arthropathy is demonstrated throug
hout the cervical spine.  Visualized superior ribs are intact.  

 

Soft tissues:  Prevertebral soft tissues are normal in thickness.  No paravertebral hematomas.  No ap
ical pneumothoraces. There is a left thyroid nodule measuring up to 2.3 cm. 

 

IMPRESSION:  

 

1. No fracture or subluxation.

 

2. Erosive changes within the odontoid suggestive of an inflammatory arthropathy such as rheumatoid a
rthritis.

 

3. Multilevel degenerative changes throughout cervical spine.

 

4. Left thyroid nodule measuring up to 2.3 cm. Further evaluation is recommended with thyroid ultraso
und if clinically indicated.

 

Reviewed by: Markie Tovar MD on 12/8/2021 7:56 AM [facility]

Approved by: Markie Tovar MD on 12/8/2021 7:56 AM [facility]

 

 

Station ID:  [id number]

## 2021-12-08 NOTE — CT REPORT
PROCEDURE:  LUMBAR SPINE WO

 

INDICATIONS:  fall with head/neck/back pain

 

TECHNIQUE:  

Noncontrast 3 mm thick sections acquired from the T12 level to the sacrum.  Sagittal and coronal refo
rmats were constructed.  For radiation dose reduction, the following was used:  automated exposure co
ntrol, adjustment of mA and/or kV according to patient size.

 

COMPARISON:  None.

 

FINDINGS:  

Image quality:  Excellent.  

 

Bones:  There is grade 1 anterolisthesis of L4 on L5. No acute vertebral body compression fractures. 
 No suspicious lytic or blastic bony lesions.  Central spinal caliber is of normal overall caliber.  
No pars defects.  

 

T12-L1:  Normal in appearance.  

 

L1-L2:    Mild degenerative endplate changes are seen. Central to left-sided disc bulge and bilateral
 facet arthrosis is seen without significant central canal stenosis or neural foraminal narrowing. 

 

L2-L3:    Mild degenerative endplate changes are seen. Dorsal disc osteophyte complex formation with 
mild diffuse disc bulge and bilateral facet arthrosis is seen causing mild central canal stenosis, no
 significant neural foraminal narrowing. 

 

L3-L4:   Degenerative endplate changes, loss of disc height and vacuum disc minimum is seen. There is
 broad-based disc bulge and bilateral facet arthrosis with moderate central canal stenosis and bilate
ral neural foraminal narrowing. 

 

L4-L5:   Vacuum disc phenomenon and degenerative endplate changes are seen. There is also loss of dis
c height. Broad-based disc bulge and bilateral facet arthrosis is seen with moderate central canal st
enosis and bilateral neural foraminal narrowing. 

 

L5-S1:   There is loss of disc height, degenerative endplate changes and vacuum disc phenomenon. Broa
d-based disc bulge and bilateral facet arthrosis is noted with mild central canal stenosis and right 
worse than left bilateral neural foraminal narrowing. 

 

Soft tissues:  No retroperitoneal masses or hematomas.  Visualized aorta is normal in caliber.  Moder
ate atherosclerotic calcifications in visualized abdominal aorta and bilateral iliac arteries are see
n. There is suggestion of a left renal cyst measures 2.9 x 2.6 cm in size.

 

IMPRESSION:  

1. No acute lumbar spine fracture or dislocation. Grade 1 anterolisthesis of L4 on L5. No pars defect
s.

2. Degenerative disc disease throughout lumbar spine more prominent at L3-4 through L5-S1 levels as d
escribed in detail above.

 

Reviewed by: Cody Hickman MD on 12/8/2021 8:49 AM PST

Approved by: Cody Hickman MD on 12/8/2021 8:49 AM PST

 

 

Station ID:  [id number]

## 2021-12-08 NOTE — CT REPORT
PROCEDURE:  HEAD WO

 

INDICATIONS:  fall with head/neck/back pain

 

TECHNIQUE:  

Noncontrast 4.5 mm thick angled axial sections acquired from the foramen magnum to the vertex.  For r
adiation dose reduction, the following was used:  automated exposure control, adjustment of mA and/or
 kV according to patient size.

 

COMPARISON:  9/18/2021.

 

FINDINGS:  

Image quality:  Excellent.  

 

CSF spaces:  There is moderate cerebral volume loss with prominence of the ventricles and sulci. Basa
l cisterns are patent.  No extra-axial fluid collections.  

 

Brain:  No intracranial hemorrhage, mass, or mass effect. Gray-white matter interface is preserved. T
here are subcortical and periventricular white matter hypodensities consistent with mild chronic smal
l vessel ischemic changes. 

 

 Skull and face:  Calvarium and visualized facial bones are intact, without suspicious lesions.  

 

Sinuses:  Visualized sinuses and mastoids are clear.  

 

IMPRESSION:  

 

1. No acute intracranial abnormality.

 

2. Moderate cerebral volume loss and mild chronic white matter small vessel ischemic changes.

 

Reviewed by: Markie Tovar MD on 12/8/2021 7:51 AM [facility]

Approved by: Markie Tovar MD on 12/8/2021 7:51 AM [facility]

 

 

Station ID:  [id number]

## 2021-12-08 NOTE — ED PHYSICIAN DOCUMENTATION
PD HPI Fall





- Stated complaint


Stated Complaint: GLF





- History obtained from


History obtained from: Patient, Family ( reported through EMS), EMS 

(enroute patient seemed to not remembver the fall/injury; here in ER she can 

tell me she was going to bathroom and fell, and was on floor for couple of hours

until son found her.)





- History of Present Illness


Mechanism of injury: Unknown


Fall distance: Standing position (found on bathroom floor by . Complaint 

of some neck and head pain. Enroute, complained of low back pain as well.)


Where injury occurred: Home


Timing - onset: How many hours ago (few), Today


Injury(ies) location: Neck, Back.  No: Head


Associated symptoms: No: LOC, Weakness, Paresthesias


Contributing factors: No: Anticoagulated, Intoxicated


Similar symptoms before: Has not had sx before


Recently seen: Not recently seen





Review of Systems


Constitutional: denies: Fever


Cardiac: denies: Chest pain / pressure


Respiratory: denies: Dyspnea, Cough


GI: denies: Abdominal Pain, Vomiting, Diarrhea


: reports: Frequency (recent), Hesitancy


Neurologic: reports: Confused (chronic dementia).  denies: Focal weakness, 

Numbness, Headache, Head injury





PD PAST MEDICAL HISTORY





- Past Medical History


Cardiovascular: Hypertension, Deep vein thrombosis, Pulmonary embolism, Atrial 

fibrillation


Respiratory: None


Neuro: Dementia, Fainting


Endocrine/Autoimmune: None


GI: None


GYN: Other


: None


HEENT: None


Psych: None


Musculoskeletal: Osteoarthritis, Osteoporosis


Derm: Other





- Past Surgical History


Past Surgical History: Yes


Ortho: Knee replacement


/GYN: Hysterectomy


Cardiovascular: Pacemaker





- Present Medications


Home Medications: 


                                Ambulatory Orders











 Medication  Instructions  Recorded  Confirmed


 


Spironolactone 25 mg PO BID 10/12/14 12/08/21


 


Aspirin [[place] Aspirin] 81 mg PO DAILY 06/10/21 12/08/21


 


Magnesium Oxide [Magnesium] 400 mg PO DAILY 06/10/21 12/08/21


 


Nitrofurantoin Macrocrystal 50 mg PO DAILY 06/10/21 12/08/21





[Macrodantin]   


 


Oxybutynin Chloride [Ditropan Xl] 5 mg PO DAILY 06/10/21 12/08/21


 


dilTIAZem HCL [Diltiazem 24Hr  mg PO DAILY 06/10/21 12/08/21





(Xr)]   


 


Trospium Chloride 20 mg ORAL BID 12/08/21 12/08/21


 


cephALEXin [Keflex] 500 mg PO TID 5 Days #15 cap 12/08/21 














- Allergies


Allergies/Adverse Reactions: 


                                    Allergies











Allergy/AdvReac Type Severity Reaction Status Date / Time


 


shellfish derived Allergy  Unknown Verified 12/08/21 07:46














- Social History


Does the pt smoke?: No


Smoking Status: Never smoker


Does the pt drink ETOH?: No


Does the pt have substance abuse?: No





- Immunizations


Immunizations are current?: Yes





- POLST


Patient has POLST: No


POLST Status: Full Code





PD ED PE NORMAL





- Vitals


Vital signs reviewed: Yes





- General


General: Alert and oriented X 3, No acute distress, Well developed/nourished





- HEENT


HEENT: Atraumatic, Pharynx benign





- Neck


Neck: Supple, no meningeal sign, No bony TTP, No adenopathy





- Cardiac


Cardiac: RRR, No murmur





- Respiratory


Respiratory: Clear bilaterally, Other (no chestwall tenderness)





- Abdomen


Abdomen: Soft, Non tender





- Back


Back: No CVA TTP, Other (some tender in upper thoracic area and mid to lower 

lumbar area. )





- Derm


Derm: Normal color, Warm and dry





- Extremities


Extremities: No tenderness to palpate, Normal ROM s pain





- Neuro


Neuro: Alert and oriented X 3, CNs 2-12 intact, No motor deficit, Normal speech





Results





- Vitals


Vitals: 


                                     Oxygen











O2 Source [With Activity]      Room air


 


O2 Source [Without Activity]   Room air


 


O2 Source                      Room air

















- Labs


Labs: 


                                  Microbiology











 12/08/21 08:20 Urine Culture - Preliminary





 Urine,Clean Catch    CULTURE IN PROGRESS. RESULTS TO FOLLOW.








                                Laboratory Tests











  12/08/21 12/08/21 12/08/21





  08:00 08:00 08:00


 


WBC  5.4  


 


RBC  3.90 L  


 


Hgb  12.0  


 


Hct  34.6 L  


 


MCV  88.7  


 


MCH  30.8  


 


MCHC  34.7  


 


RDW  13.8  


 


Plt Count  207  


 


MPV  9.5  


 


Neut # (Auto)  3.4  


 


Lymph # (Auto)  1.2 L  


 


Mono # (Auto)  0.6  


 


Eos # (Auto)  0.1  


 


Baso # (Auto)  0.0  


 


Absolute Nucleated RBC  0.00  


 


Nucleated RBC %  0.0  


 


Sodium   135 


 


Potassium   3.9 


 


Chloride   100 L 


 


Carbon Dioxide   25 


 


Anion Gap   10.0 


 


BUN   34 H 


 


Creatinine   1.0 


 


Estimated GFR (MDRD)   52 L 


 


Glucose   106 H 


 


Calcium   11.6 H 


 


Magnesium   2.1 


 


Total Bilirubin   0.9 


 


AST   24 


 


ALT   15 


 


Alkaline Phosphatase   50 


 


Total Protein   6.8 


 


Albumin   3.9 


 


Globulin   2.9 


 


Albumin/Globulin Ratio   1.3 


 


Lipase   35 


 


TSH    1.60


 


Urine Color   


 


Urine Clarity   


 


Urine pH   


 


Ur Specific Gravity   


 


Urine Protein   


 


Urine Glucose (UA)   


 


Urine Ketones   


 


Urine Occult Blood   


 


Urine Nitrite   


 


Urine Bilirubin   


 


Urine Urobilinogen   


 


Ur Leukocyte Esterase   


 


Urine RBC   


 


Urine WBC   


 


Urine WBC Clumps   


 


Ur Epithelial Cells   


 


Ur Squamous Epith Cells   


 


Urine Bacteria   


 


Ur Microscopic Review   


 


Urine Culture Comments   














  12/08/21





  08:20


 


WBC 


 


RBC 


 


Hgb 


 


Hct 


 


MCV 


 


MCH 


 


MCHC 


 


RDW 


 


Plt Count 


 


MPV 


 


Neut # (Auto) 


 


Lymph # (Auto) 


 


Mono # (Auto) 


 


Eos # (Auto) 


 


Baso # (Auto) 


 


Absolute Nucleated RBC 


 


Nucleated RBC % 


 


Sodium 


 


Potassium 


 


Chloride 


 


Carbon Dioxide 


 


Anion Gap 


 


BUN 


 


Creatinine 


 


Estimated GFR (MDRD) 


 


Glucose 


 


Calcium 


 


Magnesium 


 


Total Bilirubin 


 


AST 


 


ALT 


 


Alkaline Phosphatase 


 


Total Protein 


 


Albumin 


 


Globulin 


 


Albumin/Globulin Ratio 


 


Lipase 


 


TSH 


 


Urine Color  YELLOW


 


Urine Clarity  HAZY


 


Urine pH  7.0


 


Ur Specific Gravity  1.015


 


Urine Protein  NEGATIVE


 


Urine Glucose (UA)  NEGATIVE


 


Urine Ketones  NEGATIVE


 


Urine Occult Blood  TRACE-INTA


 


Urine Nitrite  POSITIVE H


 


Urine Bilirubin  NEGATIVE


 


Urine Urobilinogen  0.2 (NORMAL)


 


Ur Leukocyte Esterase  LARGE H


 


Urine RBC  0-5


 


Urine WBC  >25 H


 


Urine WBC Clumps  PRESENT


 


Ur Epithelial Cells  RARE Renal Tubular


 


Ur Squamous Epith Cells  NONE SEEN


 


Urine Bacteria  Rare


 


Ur Microscopic Review  INDICATED


 


Urine Culture Comments  INDICATED














- Rads (name of study)


  ** head CT


Radiology: Prelim report reviewed (no ICH nor acute injury), See rad report





  ** spine CT


Radiology: Prelim report reviewed (no fractures C/T/L), See rad report





PD MEDICAL DECISION MAKING





- ED course


Complexity details: reviewed results, considered differential, d/w patient





Departure





- Departure


Disposition: 01 Home, Self Care


Clinical Impression: 


Fall, accidental


Qualifiers:


 Encounter type: initial encounter Qualified Code(s): W19.XXXA - Unspecified 

fall, initial encounter





UTI (urinary tract infection)


Qualifiers:


 Urinary tract infection type: acute cystitis Hematuria presence: without 

hematuria Qualified Code(s): N30.00 - Acute cystitis without hematuria





Back contusion


Qualifiers:


 Encounter type: initial encounter Laterality: unspecified laterality Qualified 

Code(s): S20.229A - Contusion of unspecified back wall of thorax, initial 

encounter





Condition: Stable


Record reviewed to determine appropriate education?: Yes


Instructions:  ED Contusion Back, ED UTI Cystitis Female


Prescriptions: 


cephALEXin [Keflex] 500 mg PO TID 5 Days #15 cap


Comments: 


We did CT scans of your head neck and spine.  There are arthritic changes 

diffusely but no signs of fractures nor misalignment.  No signs of bleeding in 

the head.  The soreness in your back would be some local impact from falling and

 you can use Tylenol 3-4 times a day if needed for pains.





Your basic blood tests of chemistry panel and blood count are normal.  Your 

urine test does show signs of some infection to it and we can give you an 

antibiotic cephalexin 3 times a day for 5 days for that.





Stay well-hydrated and usual medicines otherwise.  Follow-up with your primary 

care if needed.


Discharge Date/Time: 12/08/21 11:11

## 2022-03-18 ENCOUNTER — HOSPITAL ENCOUNTER (OUTPATIENT)
Dept: HOSPITAL 76 - EMS | Age: 87
End: 2022-03-18
Payer: MEDICARE

## 2022-03-18 DIAGNOSIS — Z03.89: Primary | ICD-10-CM

## 2022-12-13 ENCOUNTER — HOSPITAL ENCOUNTER (OUTPATIENT)
Dept: HOSPITAL 76 - EMS | Age: 87
Discharge: ANOTHER HOSPITAL | End: 2022-12-13
Payer: MEDICARE

## 2022-12-13 DIAGNOSIS — M25.552: Primary | ICD-10-CM
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
## 2022-12-14 ENCOUNTER — HOSPITAL ENCOUNTER (OUTPATIENT)
Dept: HOSPITAL 76 - EMS | Age: 87
Discharge: HOME | End: 2022-12-14
Attending: EMERGENCY MEDICINE
Payer: MEDICARE

## 2022-12-14 DIAGNOSIS — R41.0: Primary | ICD-10-CM

## 2022-12-14 DIAGNOSIS — F03.90: ICD-10-CM

## 2022-12-14 DIAGNOSIS — M25.552: ICD-10-CM

## 2023-02-16 NOTE — PHARMACY PROGRESS NOTE
- Best Possible Medication History


Admit Date and Time: 06/10/21 2134


Processed by: Pharmacy


Medication History completed: Yes


Patient Interview: Completed


Secondary Source(s): Written medication list, Pharmacy records, Insurance 

records





As the person ultimately responsible for medication therapy, providers are able 

to order a medication from an existing home medication list in [facility] via the 

"Reconcile Routine" prior to Confirmation of that medication by support staff. 

Such practice is discouraged except when the physician, in their clinical 

judgment, deems that a medical need exists for a medication without regard to 

previous use. good balance

## 2024-07-29 NOTE — CT REPORT
[facility]        NAME: Priya Florez is a 69 y.o. female  : 1955    MRN: [id number]  DATE: 2024  TIME: 3:05 PM    Assessment and Plan   Abscess of chest wall [L02.213]  1. Abscess of chest wall  Wound culture and Gram stain    cephalexin (KEFLEX) 500 mg capsule        Bedside I&D performed with improvement of symptoms (see procedure documentation). Antibiotic prophylaxis as directed. Will send wound culture and f/u if need to change antibiotic. VSS in clinic, appears in no acute distress. Educated on use of OTC products for symptoms. Advised close follow-up with PCP or to report to the ER if symptoms worsen. Patient verbalizes understanding and agreeable to plan.       Patient Instructions     Take antibiotics as directed for next 7 days. Keep area covered with band-aid for 24 hours. May take tylenol/ibuprofen every 4-6 hours as needed for pain. Will follow-up with wound culture results if need to change antibiotic. Follow-up with PCP in 3-5 days if no improvement of symptoms. Report to ER if symptoms worsen or develop worsening pain, redness, or swelling around site of injury.       Chief Complaint     Chief Complaint   Patient presents with    chest mass     Pt was seen and had ultra sound done On Wednesday for cyst on upper chest. Scheduled for removal . Over the weekend pt developed fluid to the area and whiteness over the area.          History of Present Illness       69 year old female presents for evaluation of abscess to her left anterior chest wall for the past 3-4 days. She reports last week she noticed a lump that she had ultrasound done of on Thursday and noticed increased swelling with a yellow lump develop on her chest the next day. She relates the US report came back as a cyst that she is scheduled to have remove next month. She is not diabetic. She denies fevers, body aches, fatigue or chills. She reports mild discomfort at the site of the abscess when  PROCEDURE:  THORACIC SPINE WO

 

INDICATIONS:  fall with head/neck/back pain

 

TECHNIQUE:  

Noncontrast 3 mm thick sections acquired through the region of interest in the thoracic spine.  Sagit
jeremie and coronal reformats were then constructed. For radiation dose reduction, the following was used
:  automated exposure control, adjustment of mA and/or kV according to patient size. 

 

COMPARISON:  CT chest 6/11/2021.

 

FINDINGS:  

Image quality: There is mild motion artifact.  

 

Bones:  No fractures or subluxation. No acute vertebral body compression fractures. There is mild to 
moderate multilevel degenerative disc disease throughout the thoracic spine with loss of disc height 
anteriorly, subchondral sclerosis, and osteophytosis. No suspicious sclerotic or lytic bony lesions. 
 Central spinal canal is of normal overall caliber.  

 

Soft tissues:  No paravertebral masses or hematomas.  Visualized lungs demonstrate bilateral centrilo
bular emphysematous changes. There is bilateral nodular thickening of the adrenal glands which appear
s similar to the prior CT study. These demonstrate attenuation values compatible with lipid rich mor
omas. A cyst is partially visualized within the liver. There are pacemaker leads within the heart par
tially visualized. A left thyroid nodule measuring up to 2.3 cm is demonstrated.

 

IMPRESSION:  

 

1. No acute fracture or subluxation.

 

2. Mild to moderate multilevel degenerative disc disease throughout the thoracic spine.

 

3. Nodular thickening of the adrenal glands appears similar to the prior study and demonstrate attenu
ation values compatible with lipid rich adenomas.

 

4. Left thyroid nodule demonstrated measuring up to 2.3 cm. Further evaluation is recommended with ul
trasound if clinically indicated.

 

Reviewed by: Markie Tovar MD on 12/8/2021 8:05 AM [facility]

Approved by: Markie Tovar MD on 12/8/2021 8:05 AM [facility]

 

 

Station ID:  [id number] touched. She has not tried any interventions for symptoms.     Wound Check  She was originally treated 3 to 5 days ago. There has been no drainage from the wound. There is new redness present. There is new swelling present. There is new pain present. She has no difficulty moving the affected extremity or digit.       Review of Systems   Review of Systems   Constitutional:  Negative for activity change, appetite change, chills, fatigue and fever.   Respiratory:  Negative for cough, chest tightness and shortness of breath.    Cardiovascular:  Negative for chest pain and palpitations.   Gastrointestinal:  Negative for abdominal pain, constipation, diarrhea, nausea and vomiting.   Musculoskeletal:  Negative for arthralgias, back pain, myalgias and neck pain.   Skin:  Positive for color change, rash and wound. Negative for pallor.   Allergic/Immunologic: Negative for environmental allergies and food allergies.   Neurological:  Negative for dizziness, light-headedness and headaches.         Current Medications       Current Outpatient Medications:     carbidopa-levodopa (SINEMET)  mg per tablet, Take 1 tablet by mouth 3 (three) times a day, Disp: 270 tablet, Rfl: 1    cephalexin (KEFLEX) 500 mg capsule, Take 1 capsule (500 mg total) by mouth every 8 (eight) hours for 7 days, Disp: 21 capsule, Rfl: 0    Cholecalciferol 100 MCG (4000 UT) CAPS, Take 4,000 mg by mouth daily, Disp: , Rfl:     [employer] FIBER GUMMIES PO, Take by mouth, Disp: , Rfl:     Cyanocobalamin (B-12 PO), Take by mouth in the morning, Disp: , Rfl:     Multiple Vitamins-Minerals (Centrum Silver 50+Women) TABS, , Disp: , Rfl:     Current Allergies     Allergies as of 07/29/2024 - Reviewed 07/29/2024   Allergen Reaction Noted    Meperidine  07/30/2012    Pistachio nut (diagnostic) - food allergy Hives 06/27/2023            The following portions of the patient's history were reviewed and updated as appropriate: allergies, current medications, past family  history, past medical history, past social history, past surgical history and problem list.     History reviewed. No pertinent past medical history.    Past Surgical History:   Procedure Laterality Date    TONSILLECTOMY      TUBAL LIGATION         Family History   Problem Relation Age of Onset    Breast cancer Mother 40    Dementia Father     Stroke Sister     No Known Problems Sister     No Known Problems Sister     No Known Problems Maternal Grandmother     Prostate cancer Maternal Grandfather     No Known Problems Paternal Grandmother     Cancer Maternal Aunt          Medications have been verified.        Objective   Pulse 86   Temp 98.6 °F (37 °C)   Resp 18   SpO2 99%        Physical Exam     Physical Exam  Vitals and nursing note reviewed.   Constitutional:       General: She is awake. She is not in acute distress.     Appearance: Normal appearance. She is well-developed and normal weight.   HENT:      Head: Normocephalic and atraumatic.   Cardiovascular:      Rate and Rhythm: Normal rate and regular rhythm.      Pulses: Normal pulses.      Heart sounds: Normal heart sounds.   Pulmonary:      Effort: Pulmonary effort is normal.      Breath sounds: Normal breath sounds.   Musculoskeletal:         General: Swelling present.      Cervical back: Normal range of motion and neck supple.   Skin:     General: Skin is warm and dry.      Findings: Abscess present. No erythema or rash.             Comments: Abscess (0.5 cm) left anterior chest; yellow head present, mild surrounding erythema. No discharge.   Neurological:      General: No focal deficit present.      Mental Status: She is alert and oriented to person, place, and time.   Psychiatric:         Mood and Affect: Mood normal.         Behavior: Behavior normal. Behavior is cooperative.         Thought Content: Thought content normal.         Judgment: Judgment normal.     Incision and drain    Date/Time: 7/29/2024 2:00 PM    Performed by: Yara Jeffries  JIM  Authorized by: JIM Villatoro  Universal Protocol:  Consent: Verbal consent obtained.  Risks and benefits: risks, benefits and alternatives were discussed  Consent given by: patient  Patient understanding: patient states understanding of the procedure being performed  Patient consent: the patient's understanding of the procedure matches consent given  Required items: required blood products, implants, devices, and special equipment available  Patient identity confirmed: verbally with patient    Patient location:  Bedside  Location:     Type:  Abscess    Location:  Trunk    Trunk location:  Chest  Pre-procedure details:     Skin preparation:  Betadine  Anesthesia (see MAR for exact dosages):     Anesthesia method:  Local infiltration    Local anesthetic:  Lidocaine 1% WITH epi  Procedure details:     Complexity:  Simple    Needle aspiration: no      Incision types:  Stab incision    Aspiration type: puncture aspiration      Scalpel blade:  11    Approach:  Open    Incision depth:  Subcutaneous    Wound management:  Probed and deloculated    Irrigation with saline:  5    Drainage:  Purulent    Drainage amount:  Moderate    Wound treatment:  Wound left open    Packing materials:  None  Post-procedure details:     Patient tolerance of procedure:  Tolerated well, no immediate complications